# Patient Record
Sex: FEMALE | Race: WHITE | NOT HISPANIC OR LATINO | Employment: OTHER | URBAN - METROPOLITAN AREA
[De-identification: names, ages, dates, MRNs, and addresses within clinical notes are randomized per-mention and may not be internally consistent; named-entity substitution may affect disease eponyms.]

---

## 2018-09-06 LAB — CRC RECOMMENDATION EXT: NORMAL

## 2020-04-07 RX ORDER — MEDROXYPROGESTERONE ACETATE 5 MG/1
1 TABLET ORAL DAILY
COMMUNITY
Start: 2020-01-09 | End: 2020-04-07 | Stop reason: SDUPTHER

## 2020-04-07 RX ORDER — MEDROXYPROGESTERONE ACETATE 5 MG/1
5 TABLET ORAL DAILY
Qty: 90 TABLET | Refills: 2 | Status: SHIPPED | OUTPATIENT
Start: 2020-04-07 | End: 2020-08-13

## 2020-08-13 ENCOUNTER — OFFICE VISIT (OUTPATIENT)
Dept: UROLOGY | Facility: CLINIC | Age: 70
End: 2020-08-13
Payer: MEDICARE

## 2020-08-13 VITALS
SYSTOLIC BLOOD PRESSURE: 128 MMHG | RESPIRATION RATE: 18 BRPM | HEART RATE: 93 BPM | BODY MASS INDEX: 25.69 KG/M2 | WEIGHT: 145 LBS | DIASTOLIC BLOOD PRESSURE: 88 MMHG | HEIGHT: 63 IN

## 2020-08-13 DIAGNOSIS — R30.0 DYSURIA: Primary | ICD-10-CM

## 2020-08-13 DIAGNOSIS — N39.0 RECURRENT UTI: ICD-10-CM

## 2020-08-13 LAB — Lab: 100

## 2020-08-13 PROCEDURE — 99203 OFFICE O/P NEW LOW 30 MIN: CPT | Mod: S$GLB,,, | Performed by: UROLOGY

## 2020-08-13 PROCEDURE — 99203 PR OFFICE/OUTPT VISIT, NEW, LEVL III, 30-44 MIN: ICD-10-PCS | Mod: S$GLB,,, | Performed by: UROLOGY

## 2020-08-13 RX ORDER — FLECAINIDE ACETATE 50 MG/1
TABLET ORAL
COMMUNITY
Start: 2020-07-27

## 2020-08-13 RX ORDER — AMLODIPINE BESYLATE 2.5 MG/1
2.5 TABLET ORAL 2 TIMES DAILY
COMMUNITY
Start: 2020-07-20 | End: 2024-02-26

## 2020-08-13 RX ORDER — CARVEDILOL 6.25 MG/1
6.25 TABLET ORAL 2 TIMES DAILY
COMMUNITY
Start: 2020-06-15 | End: 2023-07-24

## 2020-08-13 RX ORDER — OMEPRAZOLE AND SODIUM BICARBONATE 40; 1100 MG/1; MG/1
1 CAPSULE ORAL
COMMUNITY
End: 2023-07-12

## 2020-08-13 RX ORDER — SERTRALINE HYDROCHLORIDE 100 MG/1
TABLET, FILM COATED ORAL
COMMUNITY
Start: 2020-07-13 | End: 2022-03-31 | Stop reason: ALTCHOICE

## 2020-08-13 RX ORDER — AMITRIPTYLINE HYDROCHLORIDE 10 MG/1
10 TABLET, FILM COATED ORAL NIGHTLY
COMMUNITY
Start: 2020-07-27 | End: 2023-02-06 | Stop reason: SDUPTHER

## 2020-08-13 RX ORDER — LEVOTHYROXINE SODIUM 112 UG/1
112 TABLET ORAL
COMMUNITY
Start: 2020-07-27 | End: 2022-04-14

## 2020-08-13 RX ORDER — PHENAZOPYRIDINE HYDROCHLORIDE 100 MG/1
200 TABLET, FILM COATED ORAL 3 TIMES DAILY PRN
Qty: 15 TABLET | Refills: 1 | Status: SHIPPED | OUTPATIENT
Start: 2020-08-13 | End: 2020-08-18

## 2020-08-13 NOTE — PROGRESS NOTES
Subjective:       Patient ID: Brigitte Bell is a 69 y.o. female.    Chief Complaint: Urinary Tract Infection (burning with urination)      HPI:  69-year-old female with burning with urination she has had UTI for the past 2 weeks she was treated with ciprofloxacin followed by a cephalosporin.  She continues to have dysuria and burning at her urethra.    Past Medical History:   Past Medical History:   Diagnosis Date    Thyroid disease        Past Surgical Historical: History reviewed. No pertinent surgical history.     Medications:   Medication List with Changes/Refills   Current Medications    AMITRIPTYLINE (ELAVIL) 10 MG TABLET    TK 1 T PO HS    AMLODIPINE (NORVASC) 2.5 MG TABLET    TK 1 T PO QD    CARVEDILOL (COREG) 6.25 MG TABLET    TK 1 T PO BID    FLECAINIDE (TAMBOCOR) 50 MG TAB    TK 1 T PO BID    LEVOTHYROXINE (SYNTHROID) 112 MCG TABLET    TK 1 T PO QD    MEDROXYPROGESTERONE (PROVERA) 5 MG TABLET    Take 1 tablet (5 mg total) by mouth once daily.    OMEPRAZOLE-SODIUM BICARBONATE (ZEGERID) 40-1.1 MG-GRAM PER CAPSULE    Take 1 capsule by mouth before breakfast.    SERTRALINE (ZOLOFT) 100 MG TABLET    TK 1 T PO ONCE DAILY        Past Social History:   Social History     Socioeconomic History    Marital status:      Spouse name: Not on file    Number of children: Not on file    Years of education: Not on file    Highest education level: Not on file   Occupational History    Not on file   Social Needs    Financial resource strain: Not on file    Food insecurity     Worry: Not on file     Inability: Not on file    Transportation needs     Medical: Not on file     Non-medical: Not on file   Tobacco Use    Smoking status: Never Smoker    Smokeless tobacco: Never Used   Substance and Sexual Activity    Alcohol use: Not Currently    Drug use: Not Currently    Sexual activity: Yes   Lifestyle    Physical activity     Days per week: Not on file     Minutes per session: Not on file    Stress: Not on  file   Relationships    Social connections     Talks on phone: Not on file     Gets together: Not on file     Attends Yazdanism service: Not on file     Active member of club or organization: Not on file     Attends meetings of clubs or organizations: Not on file     Relationship status: Not on file   Other Topics Concern    Not on file   Social History Narrative    Not on file       Allergies: Review of patient's allergies indicates:  No Known Allergies     Family History:   Family History   Problem Relation Age of Onset    Hypertension Father     Diabetes Mother     Hypertension Mother         Review of Systems:  Review of Systems   Constitutional: Negative for activity change and appetite change.   HENT: Negative for congestion and dental problem.    Respiratory: Negative for chest tightness and shortness of breath.    Cardiovascular: Negative for chest pain.   Gastrointestinal: Negative for abdominal distention.   Genitourinary: Negative for decreased urine volume, difficulty urinating, dyspareunia, dysuria, enuresis, flank pain, frequency, genital sores, hematuria, menstrual problem, pelvic pain, urgency, vaginal bleeding, vaginal discharge and vaginal pain.   Musculoskeletal: Negative for back pain and neck pain.   Neurological: Negative for dizziness.   Hematological: Negative for adenopathy.   Psychiatric/Behavioral: Negative for agitation, behavioral problems and confusion.       Physical Exam:  Physical Exam   Constitutional: She is oriented to person, place, and time. She appears well-developed.   HENT:   Head: Normocephalic and atraumatic.   Right Ear: External ear normal.   Left Ear: External ear normal.   Eyes: Conjunctivae are normal.   Neck: Normal range of motion. No JVD present.   Cardiovascular: Normal rate and regular rhythm.    Pulmonary/Chest: Effort normal and breath sounds normal. No respiratory distress. She has no wheezes.   Abdominal: Soft. She exhibits no distension. There is no  abdominal tenderness. There is no rebound.   Genitourinary:    Vulva normal.     Musculoskeletal: Normal range of motion.   Neurological: She is alert and oriented to person, place, and time.   Skin: Skin is warm and dry. No rash noted. No erythema.     Psychiatric: Her behavior is normal.       Assessment/Plan:       Problem List Items Addressed This Visit     None      Visit Diagnoses     Dysuria    -  Primary    Recurrent UTI                 Recurrent UTI:  Patient's urinalysis today was reviewed and appeared to be completely normal on pelvic exam her urethra did not appear to be irritated and her vulva appeared normal.  She likely has continued irritation from her recent UTI she is currently finishing her cephalosporin.  I will prescribed 4 days of 200 mg Pyridium for analgesia after she finishes her antibiotics if she does not feel better she is to return to clinic for re-evaluation

## 2022-03-31 ENCOUNTER — OFFICE VISIT (OUTPATIENT)
Dept: PRIMARY CARE CLINIC | Facility: CLINIC | Age: 72
End: 2022-03-31
Payer: MEDICARE

## 2022-03-31 VITALS
WEIGHT: 147.19 LBS | DIASTOLIC BLOOD PRESSURE: 77 MMHG | SYSTOLIC BLOOD PRESSURE: 133 MMHG | OXYGEN SATURATION: 100 % | BODY MASS INDEX: 26.08 KG/M2 | HEART RATE: 73 BPM | HEIGHT: 63 IN

## 2022-03-31 DIAGNOSIS — E03.9 ACQUIRED HYPOTHYROIDISM: ICD-10-CM

## 2022-03-31 DIAGNOSIS — Z12.39 ENCOUNTER FOR SCREENING FOR MALIGNANT NEOPLASM OF BREAST, UNSPECIFIED SCREENING MODALITY: Primary | ICD-10-CM

## 2022-03-31 DIAGNOSIS — I49.9 CARDIAC ARRHYTHMIA, UNSPECIFIED CARDIAC ARRHYTHMIA TYPE: ICD-10-CM

## 2022-03-31 DIAGNOSIS — Z78.0 POST-MENOPAUSAL: ICD-10-CM

## 2022-03-31 DIAGNOSIS — Z12.31 ENCOUNTER FOR SCREENING MAMMOGRAM FOR MALIGNANT NEOPLASM OF BREAST: ICD-10-CM

## 2022-03-31 PROCEDURE — 99204 OFFICE O/P NEW MOD 45 MIN: CPT | Mod: S$GLB,,, | Performed by: INTERNAL MEDICINE

## 2022-03-31 PROCEDURE — 99204 PR OFFICE/OUTPT VISIT, NEW, LEVL IV, 45-59 MIN: ICD-10-PCS | Mod: S$GLB,,, | Performed by: INTERNAL MEDICINE

## 2022-03-31 RX ORDER — DICLOFENAC SODIUM 10 MG/G
2 GEL TOPICAL
COMMUNITY

## 2022-03-31 RX ORDER — ONDANSETRON 4 MG/1
4 TABLET, FILM COATED ORAL
COMMUNITY
Start: 2022-03-26 | End: 2023-07-24

## 2022-03-31 RX ORDER — HYOSCYAMINE SULFATE 0.125 MG
125 TABLET ORAL EVERY 4 HOURS PRN
COMMUNITY
End: 2023-07-24

## 2022-03-31 NOTE — PROGRESS NOTES
Subjective:      Patient ID: Brigitte Bell is a 71 y.o. female.    Chief Complaint: Establish Care    HPI     Past Medical History:   Diagnosis Date    Cystitis     Diarrhea     GERD (gastroesophageal reflux disease)     Hypertension     Insomnia     Stomach cramps     Thyroid disease      Past Surgical History:   Procedure Laterality Date    CHOLECYSTECTOMY      COLONOSCOPY      2020    GASTROSCOPY         Family History   Problem Relation Age of Onset    Hypertension Father     Diabetes Mother     Hypertension Mother        Social History     Socioeconomic History    Marital status:    Tobacco Use    Smoking status: Never Smoker    Smokeless tobacco: Never Used   Substance and Sexual Activity    Alcohol use: Not Currently    Drug use: Not Currently    Sexual activity: Yes       Dr Jin is her Cardiologist  Dr Keith at [facility]  Dr Sawn in [place] is her Urologist      Review of Systems   Constitutional: Negative for chills and fever.   HENT: Negative for hearing loss.    Eyes: Negative for blurred vision.   Respiratory: Negative for cough, shortness of breath and wheezing.    Cardiovascular: Negative for chest pain, palpitations and leg swelling.   Gastrointestinal: Negative for abdominal pain, blood in stool, constipation, diarrhea, nausea and vomiting.   Genitourinary: Positive for dysuria. Negative for frequency and urgency.   Musculoskeletal: Negative for falls and joint pain.   Skin: Negative for itching and rash.   Neurological: Negative for dizziness and headaches.   Endo/Heme/Allergies: Does not bruise/bleed easily.   Psychiatric/Behavioral: Negative for depression. The patient is not nervous/anxious.      Objective:     Physical Exam  Vitals reviewed.   Constitutional:       Appearance: Normal appearance.   HENT:      Head: Normocephalic.      Right Ear: Ear canal and external ear normal. There is no impacted cerumen.      Left  "Ear: Ear canal and external ear normal. There is no impacted cerumen.      Mouth/Throat:      Mouth: Mucous membranes are moist.      Pharynx: Oropharynx is clear.   Eyes:      Extraocular Movements: Extraocular movements intact.      Conjunctiva/sclera: Conjunctivae normal.      Pupils: Pupils are equal, round, and reactive to light.   Cardiovascular:      Rate and Rhythm: Normal rate and regular rhythm.   Pulmonary:      Effort: Pulmonary effort is normal.      Breath sounds: Normal breath sounds.   Abdominal:      General: Bowel sounds are normal.   Musculoskeletal:         General: Normal range of motion.      Right lower leg: No edema.      Left lower leg: No edema.   Skin:     General: Skin is warm.      Capillary Refill: Capillary refill takes less than 2 seconds.   Neurological:      General: No focal deficit present.      Mental Status: She is alert and oriented to person, place, and time.   Psychiatric:         Mood and Affect: Mood normal.        /77 (BP Location: Left arm, Patient Position: Sitting, BP Method: Medium (Automatic))   Pulse 73   Ht 5' 3" (1.6 m)   Wt 66.8 kg (147 lb 3.2 oz)   SpO2 100%   BMI 26.08 kg/m²     Assessment:       ICD-10-CM ICD-9-CM   1. Encounter for screening for malignant neoplasm of breast, unspecified screening modality  Z12.39 V76.10   2. Post-menopausal  Z78.0 V49.81   3. Encounter for screening mammogram for malignant neoplasm of breast   Z12.31 V76.12   4. Acquired hypothyroidism  E03.9 244.9   5. Cardiac arrhythmia, unspecified cardiac arrhythmia type  I49.9 427.9       Plan:     Medication List with Changes/Refills   Current Medications    AMITRIPTYLINE (ELAVIL) 10 MG TABLET    Take 10 mg by mouth every evening.    AMLODIPINE (NORVASC) 2.5 MG TABLET    Take 2.5 mg by mouth 2 (two) times daily.    CARVEDILOL (COREG) 6.25 MG TABLET    Take 6.25 mg by mouth 2 (two) times daily.    DICLOFENAC SODIUM (VOLTAREN) 1 % GEL    Apply 2 g topically as needed.    " ESTRADIOL 10 MCG INPK    0.01% VAGINAL VREAM- APPL Q HAS    FLECAINIDE (TAMBOCOR) 50 MG TAB    TK 1 T PO BID    HYOSCYAMINE (ANASPAZ,LEVSIN) 0.125 MG TAB    Take 125 mcg by mouth every 4 (four) hours as needed.    LEVOTHYROXINE (SYNTHROID) 112 MCG TABLET    Take 112 mcg by mouth before breakfast.    MEDROXYPROGESTERONE (PROVERA) 5 MG TABLET    Take 1 tablet (5 mg total) by mouth once daily.    OMEPRAZOLE-SODIUM BICARBONATE (ZEGERID) 40-1.1 MG-GRAM PER CAPSULE    Take 1 capsule by mouth before breakfast.    ONDANSETRON (ZOFRAN) 4 MG TABLET    Take 4 mg by mouth as needed.   Discontinued Medications    SERTRALINE (ZOLOFT) 100 MG TABLET    TK 1 T PO ONCE DAILY        Encounter for screening for malignant neoplasm of breast, unspecified screening modality  -     Mammo Digital Screening Bilat; Future; Expected date: 03/31/2022    Post-menopausal  -     DXA Bone Density Appendicular Skeleton; Future; Expected date: 03/31/2022    Encounter for screening mammogram for malignant neoplasm of breast   -     Mammo Digital Screening Bilat; Future; Expected date: 03/31/2022    Acquired hypothyroidism    Cardiac arrhythmia, unspecified cardiac arrhythmia type

## 2022-04-08 ENCOUNTER — PATIENT MESSAGE (OUTPATIENT)
Dept: PRIMARY CARE CLINIC | Facility: CLINIC | Age: 72
End: 2022-04-08
Payer: MEDICARE

## 2022-04-08 ENCOUNTER — TELEPHONE (OUTPATIENT)
Dept: PRIMARY CARE CLINIC | Facility: CLINIC | Age: 72
End: 2022-04-08
Payer: MEDICARE

## 2022-04-08 NOTE — TELEPHONE ENCOUNTER
----- Message from Navya Wilson sent at 4/8/2022  9:14 AM CDT -----  Regarding: P advice  Contact: Pt  Pt is calling to speak to nurse regarding having her thyroid medication changed and increased. Please call back at [phone]//thank you acc

## 2022-04-13 ENCOUNTER — PATIENT OUTREACH (OUTPATIENT)
Dept: ADMINISTRATIVE | Facility: HOSPITAL | Age: 72
End: 2022-04-13
Payer: MEDICARE

## 2022-04-13 NOTE — PROGRESS NOTES
Fax request sent to [facility] in [facility] for Colonoscopy    4.21.2022 Fax request sent again for Colonoscopy results.

## 2022-04-13 NOTE — LETTER
AUTHORIZATION FOR RELEASE OF   CONFIDENTIAL INFORMATION    Dear [facility]    We are seeing Brigitte Bell, date of birth 1950, in the clinic at [facility]. Lotus Berger MD is the patient's PCP. Brigitte Bell has an outstanding lab/procedure at the time we reviewed her chart. In order to help keep her health information updated, she has authorized us to request the following medical record(s):        (  )  MAMMOGRAM                                      (X  )  COLONOSCOPY      (  )  PAP SMEAR                                          (  )  OUTSIDE LAB RESULTS     (  )  DEXA SCAN                                          (  )  EYE EXAM            (  )  FOOT EXAM                                          (  )  ENTIRE RECORD     (  )  OUTSIDE IMMUNIZATIONS                 (  )  _______________         Please fax records to [facility], [phone]     If you have any questions, please contact Adelaida LOPEZ [facility] Care Coordinator at   [phone]          Patient Name: Brigitte Bell  : 1950  Patient Phone #: [phone]

## 2022-04-14 ENCOUNTER — OFFICE VISIT (OUTPATIENT)
Dept: PRIMARY CARE CLINIC | Facility: CLINIC | Age: 72
End: 2022-04-14
Payer: MEDICARE

## 2022-04-14 ENCOUNTER — PATIENT MESSAGE (OUTPATIENT)
Dept: PRIMARY CARE CLINIC | Facility: CLINIC | Age: 72
End: 2022-04-14

## 2022-04-14 ENCOUNTER — TELEPHONE (OUTPATIENT)
Dept: PRIMARY CARE CLINIC | Facility: CLINIC | Age: 72
End: 2022-04-14

## 2022-04-14 VITALS
HEART RATE: 78 BPM | DIASTOLIC BLOOD PRESSURE: 78 MMHG | BODY MASS INDEX: 26.05 KG/M2 | SYSTOLIC BLOOD PRESSURE: 146 MMHG | HEIGHT: 63 IN | OXYGEN SATURATION: 99 % | WEIGHT: 147 LBS

## 2022-04-14 DIAGNOSIS — K22.70 BARRETT'S ESOPHAGUS WITHOUT DYSPLASIA: ICD-10-CM

## 2022-04-14 DIAGNOSIS — Z78.0 POST-MENOPAUSAL: ICD-10-CM

## 2022-04-14 DIAGNOSIS — E03.9 ACQUIRED HYPOTHYROIDISM: Primary | ICD-10-CM

## 2022-04-14 DIAGNOSIS — I49.9 CARDIAC ARRHYTHMIA, UNSPECIFIED CARDIAC ARRHYTHMIA TYPE: ICD-10-CM

## 2022-04-14 PROCEDURE — 99214 OFFICE O/P EST MOD 30 MIN: CPT | Mod: S$GLB,,, | Performed by: INTERNAL MEDICINE

## 2022-04-14 PROCEDURE — 99214 PR OFFICE/OUTPT VISIT, EST, LEVL IV, 30-39 MIN: ICD-10-PCS | Mod: S$GLB,,, | Performed by: INTERNAL MEDICINE

## 2022-04-14 RX ORDER — LEVOTHYROXINE SODIUM 100 UG/1
100 TABLET ORAL
Qty: 30 TABLET | Refills: 11 | Status: SHIPPED | OUTPATIENT
Start: 2022-04-14 | End: 2023-05-03

## 2022-04-14 NOTE — PROGRESS NOTES
Subjective:      Patient ID: Brigitte Bell is a 71 y.o. female.    Chief Complaint: Fatigue and Results (Labs from her GI)    HPI     Here for follow up.   Her elavil has been increased to 25 mg at night for anxiety and for sleep. She states she gets enough sleep.   Dr Sagar Rodriguez in [place] is her GI physician and she has an EGD scheduled next week for Florentino's as it has been 3 years since last checked   She is no longer on the provera and just takes the estradiol cream every night, she has not had a hysterectomy in the past  Dr Jin Cardiologist at [facility] and next appointment is next week   Her thyroid levels were checked and TSH was quite low, she states she has been on 112 mcg for quite some time    Review of Systems   Constitutional: Positive for weight loss. Negative for chills and fever.        Has lost 20 lbs in the last year   HENT: Negative for hearing loss.    Eyes: Negative for blurred vision.   Respiratory: Negative for cough, shortness of breath and wheezing.    Cardiovascular: Negative for chest pain, palpitations and leg swelling.   Gastrointestinal: Positive for diarrhea. Negative for abdominal pain, blood in stool, constipation, melena, nausea and vomiting.        With IBS   Genitourinary:        Has cystitis   Musculoskeletal: Negative for falls.   Neurological: Negative for dizziness and headaches.   Psychiatric/Behavioral: Negative for depression, substance abuse and suicidal ideas. The patient is nervous/anxious and has insomnia.      Objective:     Physical Exam  Constitutional:       Appearance: Normal appearance.   HENT:      Head: Normocephalic.   Eyes:      Extraocular Movements: Extraocular movements intact.      Conjunctiva/sclera: Conjunctivae normal.      Pupils: Pupils are equal, round, and reactive to light.   Cardiovascular:      Rate and Rhythm: Normal rate and regular rhythm.   Pulmonary:      Effort: Pulmonary effort is normal.      Breath sounds: Normal breath sounds.  "  Abdominal:      General: Bowel sounds are normal.   Musculoskeletal:         General: Normal range of motion.   Skin:     General: Skin is warm.      Capillary Refill: Capillary refill takes less than 2 seconds.   Neurological:      General: No focal deficit present.      Mental Status: She is alert and oriented to person, place, and time.   Psychiatric:         Mood and Affect: Mood normal.        BP (!) 146/78 (BP Location: Left arm, Patient Position: Sitting, BP Method: Medium (Automatic))   Pulse 78   Ht 5' 3" (1.6 m)   Wt 66.7 kg (147 lb)   SpO2 99%   BMI 26.04 kg/m²     Assessment:       ICD-10-CM ICD-9-CM   1. Acquired hypothyroidism  E03.9 244.9   2. Post-menopausal  Z78.0 V49.81   3. Cardiac arrhythmia, unspecified cardiac arrhythmia type  I49.9 427.9   4. 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   Current Medications    AMITRIPTYLINE (ELAVIL) 10 MG TABLET    Take 10 mg by mouth every evening.    AMLODIPINE (NORVASC) 2.5 MG TABLET    Take 2.5 mg by mouth 2 (two) times daily.    CARVEDILOL (COREG) 6.25 MG TABLET    Take 6.25 mg by mouth 2 (two) times daily.    DICLOFENAC SODIUM (VOLTAREN) 1 % GEL    Apply 2 g topically as needed.    ESTRADIOL 10 MCG INPK    0.01% VAGINAL VREAM- APPL Q HAS    FLECAINIDE (TAMBOCOR) 50 MG TAB    TK 1 T PO BID    HYOSCYAMINE (ANASPAZ,LEVSIN) 0.125 MG TAB    Take 125 mcg by mouth every 4 (four) hours as needed.    MEDROXYPROGESTERONE (PROVERA) 5 MG TABLET    Take 1 tablet (5 mg total) by mouth once daily.    OMEPRAZOLE-SODIUM BICARBONATE (ZEGERID) 40-1.1 MG-GRAM PER CAPSULE    Take 1 capsule by mouth before breakfast.    ONDANSETRON (ZOFRAN) 4 MG TABLET    Take 4 mg by mouth as needed.   Discontinued Medications    LEVOTHYROXINE (SYNTHROID) 112 MCG TABLET    Take 112 mcg by mouth before " breakfast.        Acquired hypothyroidism  -     levothyroxine (SYNTHROID) 100 MCG tablet; Take 1 tablet (100 mcg total) by mouth before breakfast.  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

## 2022-04-14 NOTE — TELEPHONE ENCOUNTER
----- Message from Daisy Beualieu sent at 4/14/2022 10:43 AM CDT -----  Contact: Patient  Patient need her order sent over for her bone density test    Please let patient know once order has been sent over      Call back #  [phone]

## 2022-05-04 LAB — BCS RECOMMENDATION EXT: NORMAL

## 2022-05-05 ENCOUNTER — PATIENT MESSAGE (OUTPATIENT)
Dept: PRIMARY CARE CLINIC | Facility: CLINIC | Age: 72
End: 2022-05-05
Payer: MEDICARE

## 2022-05-27 ENCOUNTER — PATIENT OUTREACH (OUTPATIENT)
Dept: ADMINISTRATIVE | Facility: HOSPITAL | Age: 72
End: 2022-05-27
Payer: MEDICARE

## 2022-05-27 NOTE — PROGRESS NOTES
Uploading and hyper-linking Mammogram done 5.4.2022. Colonoscopy done 9.6.2018. Unable to locate path results. Multiple communications made to obtain entire colonoscopy report but no success. [place]

## 2022-06-03 ENCOUNTER — TELEPHONE (OUTPATIENT)
Dept: PRIMARY CARE CLINIC | Facility: CLINIC | Age: 72
End: 2022-06-03
Payer: MEDICARE

## 2022-06-03 NOTE — TELEPHONE ENCOUNTER
The patient is advised her thyroid labs are ready and she can get this done at her convenience.       ----- Message from Lina Ruiz sent at 6/3/2022 10:06 AM CDT -----  Contact: self  Pt calling about getting blood drawn to check thyroid levels.  Pt can be reached at [phone].      Thanks,

## 2022-06-09 LAB
T4 FREE SERPL-MCNC: 1.1 NG/DL (ref 0.8–1.8)
TSH SERPL-ACNC: 0.88 MIU/L (ref 0.4–4.5)

## 2022-06-16 ENCOUNTER — PATIENT MESSAGE (OUTPATIENT)
Dept: PRIMARY CARE CLINIC | Facility: CLINIC | Age: 72
End: 2022-06-16
Payer: MEDICARE

## 2022-07-14 ENCOUNTER — OFFICE VISIT (OUTPATIENT)
Dept: PRIMARY CARE CLINIC | Facility: CLINIC | Age: 72
End: 2022-07-14
Payer: MEDICARE

## 2022-07-14 VITALS
HEIGHT: 63 IN | OXYGEN SATURATION: 97 % | BODY MASS INDEX: 26.26 KG/M2 | WEIGHT: 148.19 LBS | HEART RATE: 75 BPM | SYSTOLIC BLOOD PRESSURE: 154 MMHG | DIASTOLIC BLOOD PRESSURE: 76 MMHG

## 2022-07-14 DIAGNOSIS — L65.9 HAIR LOSS: ICD-10-CM

## 2022-07-14 DIAGNOSIS — I49.9 CARDIAC ARRHYTHMIA, UNSPECIFIED CARDIAC ARRHYTHMIA TYPE: ICD-10-CM

## 2022-07-14 DIAGNOSIS — E78.5 HYPERLIPIDEMIA, UNSPECIFIED HYPERLIPIDEMIA TYPE: Primary | ICD-10-CM

## 2022-07-14 PROCEDURE — 99214 PR OFFICE/OUTPT VISIT, EST, LEVL IV, 30-39 MIN: ICD-10-PCS | Mod: S$GLB,,, | Performed by: INTERNAL MEDICINE

## 2022-07-14 PROCEDURE — 99214 OFFICE O/P EST MOD 30 MIN: CPT | Mod: S$GLB,,, | Performed by: INTERNAL MEDICINE

## 2022-07-14 RX ORDER — MINOXIDIL 2 %
SOLUTION, NON-ORAL TOPICAL 2 TIMES DAILY
Qty: 60 ML | Refills: 0 | Status: SHIPPED | OUTPATIENT
Start: 2022-07-14 | End: 2024-02-26

## 2022-07-14 NOTE — PROGRESS NOTES
Subjective:      Patient ID: Brigitte Bell is a 71 y.o. female.    Chief Complaint: Follow-up (No change with her hair loss.)    HPI    Patient here for follow up. She has a Gastroenterologist in [place] for IBS, she has a Cardiologist here in [place] and denies any acute complaints. Her levothyroxine was decreased and levels were stable after that. She has some female pattern hair loss. She denies dying her hair or using any chemicals. She states they break off.     She also has 2 bumps on the dorsal surface of her feet close to her ankle which have been there for some time and she is not concerned. They seem more like thickening of the skin, no palpable mass felt. She has had some foot surgery in the past     Review of Systems   Constitutional: Negative for chills, fever and weight loss.   Respiratory: Negative for cough, shortness of breath and wheezing.    Cardiovascular: Negative for chest pain, palpitations and leg swelling.   Gastrointestinal: Negative for abdominal pain, constipation, diarrhea, nausea and vomiting.   Genitourinary: Negative for dysuria, frequency and urgency.   Musculoskeletal: Positive for joint pain. Negative for falls.        Hand pain   Skin: Negative for rash.   Neurological: Negative for dizziness and headaches.   Endo/Heme/Allergies: Does not bruise/bleed easily.   Psychiatric/Behavioral: Negative for depression. The patient is nervous/anxious.      Objective:     Physical Exam  Vitals reviewed.   Constitutional:       Appearance: Normal appearance.   HENT:      Head: Normocephalic.   Eyes:      Extraocular Movements: Extraocular movements intact.      Conjunctiva/sclera: Conjunctivae normal.      Pupils: Pupils are equal, round, and reactive to light.   Cardiovascular:      Rate and Rhythm: Normal rate and regular rhythm.   Pulmonary:      Effort: Pulmonary effort is normal.      Breath sounds: Normal breath sounds.   Abdominal:      General: Bowel sounds are normal.  "  Musculoskeletal:         General: Normal range of motion.      Right lower leg: No edema.      Left lower leg: No edema.      Comments: Some thickening of joints at distal hand joints, no proximal joint swelling   Skin:     General: Skin is warm.      Capillary Refill: Capillary refill takes less than 2 seconds.      Findings: No rash.   Neurological:      General: No focal deficit present.      Mental Status: She is alert and oriented to person, place, and time.   Psychiatric:         Mood and Affect: Mood normal.        BP (!) 154/76 (BP Location: Left arm, Patient Position: Sitting, BP Method: Medium (Automatic))   Pulse 75   Ht 5' 3" (1.6 m)   Wt 67.2 kg (148 lb 3.2 oz)   SpO2 97%   BMI 26.25 kg/m²     Assessment:       ICD-10-CM ICD-9-CM   1. Hyperlipidemia, unspecified hyperlipidemia type  E78.5 272.4   2. Hair loss  L65.9 704.00   3. Cardiac arrhythmia, unspecified cardiac arrhythmia type  I49.9 427.9       Plan:     Medication List with Changes/Refills   New Medications    MINOXIDIL (ROGAINE) 2 % EXTERNAL SOLUTION    Apply topically 2 (two) times daily. Can get otc, apply very small film to avoid low BP   Current Medications    AMITRIPTYLINE (ELAVIL) 10 MG TABLET    Take 10 mg by mouth every evening.    AMLODIPINE (NORVASC) 2.5 MG TABLET    Take 2.5 mg by mouth 2 (two) times daily.    CARVEDILOL (COREG) 6.25 MG TABLET    Take 6.25 mg by mouth 2 (two) times daily.    DICLOFENAC SODIUM (VOLTAREN) 1 % GEL    Apply 2 g topically as needed.    ESTRADIOL 10 MCG INPK    0.01% VAGINAL VREAM- APPL Q HAS    FLECAINIDE (TAMBOCOR) 50 MG TAB    TK 1 T PO BID    HYOSCYAMINE (ANASPAZ,LEVSIN) 0.125 MG TAB    Take 125 mcg by mouth every 4 (four) hours as needed.    LEVOTHYROXINE (SYNTHROID) 100 MCG TABLET    Take 1 tablet (100 mcg total) by mouth before breakfast.    MEDROXYPROGESTERONE (PROVERA) 5 MG TABLET    Take 1 tablet (5 mg total) by mouth once daily.    OMEPRAZOLE-SODIUM BICARBONATE (ZEGERID) 40-1.1 MG-GRAM " PER CAPSULE    Take 1 capsule by mouth before breakfast.    ONDANSETRON (ZOFRAN) 4 MG TABLET    Take 4 mg by mouth as needed.        Hyperlipidemia, unspecified hyperlipidemia type  -     Lipid Panel; Future; Expected date: 07/14/2022    Hair loss  -     Cancel: Ambulatory referral/consult to Dermatology; Future; Expected date: 07/21/2022  -     minoxidiL (ROGAINE) 2 % external solution; Apply topically 2 (two) times daily. Can get otc, apply very small film to avoid low BP  Dispense: 60 mL; Refill: 0    Cardiac arrhythmia, unspecified cardiac arrhythmia type  -     Lipid Panel; Future; Expected date: 07/14/2022         She can try a multivitamin that has zinc as well.     Thyroid levels at next visit. She checks her BP at home and states it is more on the low side. Will need BP log at next visit

## 2022-07-15 ENCOUNTER — PATIENT MESSAGE (OUTPATIENT)
Dept: PRIMARY CARE CLINIC | Facility: CLINIC | Age: 72
End: 2022-07-15
Payer: MEDICARE

## 2022-07-15 LAB
CHOLEST SERPL-MCNC: 186 MG/DL
CHOLEST/HDLC SERPL: 2.5 (CALC)
HDLC SERPL-MCNC: 74 MG/DL
LDLC SERPL CALC-MCNC: 95 MG/DL (CALC)
NONHDLC SERPL-MCNC: 112 MG/DL (CALC)
TRIGL SERPL-MCNC: 76 MG/DL

## 2022-09-29 DIAGNOSIS — E87.5 HYPERKALEMIA: Primary | ICD-10-CM

## 2022-09-30 ENCOUNTER — PATIENT MESSAGE (OUTPATIENT)
Dept: PRIMARY CARE CLINIC | Facility: CLINIC | Age: 72
End: 2022-09-30
Payer: MEDICARE

## 2023-01-12 ENCOUNTER — OFFICE VISIT (OUTPATIENT)
Dept: PRIMARY CARE CLINIC | Facility: CLINIC | Age: 73
End: 2023-01-12
Payer: MEDICARE

## 2023-01-12 VITALS
HEIGHT: 63 IN | DIASTOLIC BLOOD PRESSURE: 70 MMHG | HEART RATE: 69 BPM | WEIGHT: 151.81 LBS | SYSTOLIC BLOOD PRESSURE: 130 MMHG | BODY MASS INDEX: 26.9 KG/M2 | OXYGEN SATURATION: 99 %

## 2023-01-12 DIAGNOSIS — E87.5 HYPERKALEMIA: ICD-10-CM

## 2023-01-12 DIAGNOSIS — Z78.0 POST-MENOPAUSAL: ICD-10-CM

## 2023-01-12 DIAGNOSIS — E78.5 HYPERLIPIDEMIA, UNSPECIFIED HYPERLIPIDEMIA TYPE: ICD-10-CM

## 2023-01-12 DIAGNOSIS — K58.0 IRRITABLE BOWEL SYNDROME WITH DIARRHEA: Primary | ICD-10-CM

## 2023-01-12 DIAGNOSIS — E03.9 ACQUIRED HYPOTHYROIDISM: ICD-10-CM

## 2023-01-12 PROCEDURE — 99214 PR OFFICE/OUTPT VISIT, EST, LEVL IV, 30-39 MIN: ICD-10-PCS | Mod: S$GLB,,, | Performed by: INTERNAL MEDICINE

## 2023-01-12 PROCEDURE — 99214 OFFICE O/P EST MOD 30 MIN: CPT | Mod: S$GLB,,, | Performed by: INTERNAL MEDICINE

## 2023-01-12 RX ORDER — MEDROXYPROGESTERONE ACETATE 5 MG/1
5 TABLET ORAL DAILY
Qty: 90 TABLET | Refills: 2 | Status: CANCELLED | OUTPATIENT
Start: 2023-01-12 | End: 2023-04-12

## 2023-01-12 NOTE — PROGRESS NOTES
Subjective:      Patient ID: Brigitte Bell is a 72 y.o. female.    Chief Complaint: Follow-up (She states she is depressed due to her IBS-D and cystitis.)    HPI      Patient here for follow up  She has a diagnosis of IBS and was seeing Dr Keith at [facility] and all imagine testing etc has been done and now she would like to establish with GI here. She states she has diarrhea in the beginning of the day. She also had a cholecystectomy and is on Questran but does not take it daily  She does report some formed stool during the day  She states she is on 25 mg of Elavil nightly and has enough refills  She does not want any other anti depressant at this time  Abdominal cramping is controlled with levsin    Review of Systems   Constitutional:  Negative for chills, fever and weight loss.   Respiratory:  Negative for cough, shortness of breath and wheezing.    Cardiovascular:  Negative for chest pain, palpitations and leg swelling.   Gastrointestinal:  Positive for abdominal pain and diarrhea. Negative for blood in stool, melena, nausea and vomiting.   Musculoskeletal:  Negative for falls and joint pain.   Skin:  Negative for rash.   Neurological:  Negative for dizziness, weakness and headaches.   Psychiatric/Behavioral:  Positive for depression. Negative for substance abuse. The patient does not have insomnia.    Objective:     Physical Exam  Vitals reviewed.   Constitutional:       Appearance: Normal appearance.   HENT:      Head: Normocephalic.   Eyes:      Extraocular Movements: Extraocular movements intact.      Conjunctiva/sclera: Conjunctivae normal.      Pupils: Pupils are equal, round, and reactive to light.   Cardiovascular:      Rate and Rhythm: Normal rate and regular rhythm.   Pulmonary:      Effort: Pulmonary effort is normal.      Breath sounds: Normal breath sounds.   Abdominal:      General: Bowel sounds are normal.   Musculoskeletal:         General: Normal range of motion.   Skin:     General: Skin is  "warm.      Capillary Refill: Capillary refill takes less than 2 seconds.   Neurological:      General: No focal deficit present.      Mental Status: She is alert and oriented to person, place, and time.   Psychiatric:         Mood and Affect: Mood normal.     /70 (BP Location: Right arm, Patient Position: Sitting, BP Method: Medium (Manual))   Pulse 69   Ht 5' 3" (1.6 m)   Wt 68.9 kg (151 lb 12.8 oz)   SpO2 99%   BMI 26.89 kg/m²     Assessment:       ICD-10-CM ICD-9-CM   1. Irritable bowel syndrome with diarrhea  K58.0 564.1   2. Hyperkalemia  E87.5 276.7   3. Post-menopausal  Z78.0 V49.81   4. Acquired hypothyroidism  E03.9 244.9   5. Hyperlipidemia, unspecified hyperlipidemia type  E78.5 272.4       Plan:     Medication List with Changes/Refills   Current Medications    AMITRIPTYLINE (ELAVIL) 10 MG TABLET    Take 10 mg by mouth every evening.    AMLODIPINE (NORVASC) 2.5 MG TABLET    Take 2.5 mg by mouth 2 (two) times daily.    CARVEDILOL (COREG) 6.25 MG TABLET    Take 6.25 mg by mouth 2 (two) times daily.    DICLOFENAC SODIUM (VOLTAREN) 1 % GEL    Apply 2 g topically as needed.    FLECAINIDE (TAMBOCOR) 50 MG TAB    TK 1 T PO BID    HYOSCYAMINE (ANASPAZ,LEVSIN) 0.125 MG TAB    Take 125 mcg by mouth every 4 (four) hours as needed.    LEVOTHYROXINE (SYNTHROID) 100 MCG TABLET    Take 1 tablet (100 mcg total) by mouth before breakfast.    MEDROXYPROGESTERONE (PROVERA) 5 MG TABLET    Take 1 tablet (5 mg total) by mouth once daily.    MINOXIDIL (ROGAINE) 2 % EXTERNAL SOLUTION    Apply topically 2 (two) times daily. Can get otc, apply very small film to avoid low BP    OMEPRAZOLE-SODIUM BICARBONATE (ZEGERID) 40-1.1 MG-GRAM PER CAPSULE    Take 1 capsule by mouth before breakfast.    ONDANSETRON (ZOFRAN) 4 MG TABLET    Take 4 mg by mouth as needed.   Changed and/or Refilled Medications    Modified Medication Previous Medication    ESTRADIOL 10 MCG INPK estradioL 10 mcg InPk       0.01% VAGINAL VREAM- APPL Q HAS " Strength: 10 mcg    0.01% VAGINAL VREAM- APPL Q HAS        1. Irritable bowel syndrome with diarrhea  -     Comprehensive Metabolic Panel; Future; Expected date: 01/12/2023  -     CBC Auto Differential; Future; Expected date: 01/12/2023  -     Amylase; Future; Expected date: 01/12/2023  -     Lipase; Future; Expected date: 01/12/2023  -     Tissue transglutaminase, IgA; Future; Expected date: 01/12/2023    2. Hyperkalemia    3. Post-menopausal    4. Acquired hypothyroidism    5. Hyperlipidemia, unspecified hyperlipidemia type    Other orders  -     estradioL 10 mcg InPk; 0.01% VAGINAL VREAM- APPL Q HAS Strength: 10 mcg  Dispense: 18 each; Refill: 3         Patient had an Abdomen/Pelvis CT done in 2020 which was showed few scattered colonic diverticula. Minimal fat stranding along the descending colon is suspicious for mild diverticulitis  She states she has had EGD and colonoscopy with GI in [place]. Will attempt to get records      Future Appointments   Date Time Provider Department Center   7/24/2023  7:00 AM Ольга Nugetn MD [facility] GASTR[facility]  [place]

## 2023-01-13 LAB
ALBUMIN SERPL-MCNC: 4.1 G/DL (ref 3.6–5.1)
ALBUMIN/GLOB SERPL: 1.6 (CALC) (ref 1–2.5)
ALP SERPL-CCNC: 63 U/L (ref 37–153)
ALT SERPL-CCNC: 21 U/L (ref 6–29)
AMYLASE SERPL-CCNC: 58 U/L (ref 21–101)
AST SERPL-CCNC: 19 U/L (ref 10–35)
BASOPHILS # BLD AUTO: 43 CELLS/UL (ref 0–200)
BASOPHILS NFR BLD AUTO: 0.7 %
BILIRUB SERPL-MCNC: 0.8 MG/DL (ref 0.2–1.2)
BUN SERPL-MCNC: 15 MG/DL (ref 7–25)
BUN/CREAT SERPL: ABNORMAL (CALC) (ref 6–22)
CALCIUM SERPL-MCNC: 8.9 MG/DL (ref 8.6–10.4)
CHLORIDE SERPL-SCNC: 99 MMOL/L (ref 98–110)
CO2 SERPL-SCNC: 27 MMOL/L (ref 20–32)
CREAT SERPL-MCNC: 0.65 MG/DL (ref 0.6–1)
EGFR: 92 ML/MIN/1.73M2
EOSINOPHIL # BLD AUTO: 98 CELLS/UL (ref 15–500)
EOSINOPHIL NFR BLD AUTO: 1.6 %
ERYTHROCYTE [DISTWIDTH] IN BLOOD BY AUTOMATED COUNT: 13.5 % (ref 11–15)
GLOBULIN SER CALC-MCNC: 2.6 G/DL (CALC) (ref 1.9–3.7)
GLUCOSE SERPL-MCNC: 74 MG/DL (ref 65–99)
HCT VFR BLD AUTO: 40.8 % (ref 35–45)
HGB BLD-MCNC: 13.7 G/DL (ref 11.7–15.5)
LIPASE SERPL-CCNC: 34 U/L (ref 7–60)
LYMPHOCYTES # BLD AUTO: 1629 CELLS/UL (ref 850–3900)
LYMPHOCYTES NFR BLD AUTO: 26.7 %
MCH RBC QN AUTO: 28.8 PG (ref 27–33)
MCHC RBC AUTO-ENTMCNC: 33.6 G/DL (ref 32–36)
MCV RBC AUTO: 85.9 FL (ref 80–100)
MONOCYTES # BLD AUTO: 451 CELLS/UL (ref 200–950)
MONOCYTES NFR BLD AUTO: 7.4 %
NEUTROPHILS # BLD AUTO: 3880 CELLS/UL (ref 1500–7800)
NEUTROPHILS NFR BLD AUTO: 63.6 %
PLATELET # BLD AUTO: 236 THOUSAND/UL (ref 140–400)
PMV BLD REES-ECKER: 10.2 FL (ref 7.5–12.5)
POTASSIUM SERPL-SCNC: 4.5 MMOL/L (ref 3.5–5.3)
PROT SERPL-MCNC: 6.7 G/DL (ref 6.1–8.1)
RBC # BLD AUTO: 4.75 MILLION/UL (ref 3.8–5.1)
SODIUM SERPL-SCNC: 132 MMOL/L (ref 135–146)
TTG IGA SER-ACNC: <1 U/ML
WBC # BLD AUTO: 6.1 THOUSAND/UL (ref 3.8–10.8)

## 2023-01-23 ENCOUNTER — TELEPHONE (OUTPATIENT)
Dept: PRIMARY CARE CLINIC | Facility: CLINIC | Age: 73
End: 2023-01-23
Payer: MEDICARE

## 2023-01-23 NOTE — TELEPHONE ENCOUNTER
The [facility] pharmacy is wanting to know if the estradiol is going to be a tab or the cream.

## 2023-01-24 ENCOUNTER — TELEPHONE (OUTPATIENT)
Dept: PRIMARY CARE CLINIC | Facility: CLINIC | Age: 73
End: 2023-01-24
Payer: MEDICARE

## 2023-01-24 NOTE — TELEPHONE ENCOUNTER
----- Message from Alexandra Bella sent at 1/24/2023 11:11 AM CST -----  Contact: Collins([facility])  Collins called or consult with nurse or staff regarding a prescription they received. He states he is needing to clarify directions and if the doctor wants to prescribe a cream or tablet. The Pharmacy would like a call back and can be reached at [phone]. Thanks/MR

## 2023-01-31 ENCOUNTER — PATIENT MESSAGE (OUTPATIENT)
Dept: PRIMARY CARE CLINIC | Facility: CLINIC | Age: 73
End: 2023-01-31
Payer: MEDICARE

## 2023-02-06 RX ORDER — AMITRIPTYLINE HYDROCHLORIDE 10 MG/1
10 TABLET, FILM COATED ORAL NIGHTLY
Qty: 90 TABLET | Refills: 3 | Status: SHIPPED | OUTPATIENT
Start: 2023-02-06 | End: 2023-02-08

## 2023-02-06 NOTE — TELEPHONE ENCOUNTER
----- Message from Veronica Diaz sent at 2/6/2023  9:57 AM CST -----  Contact: self  Type:  RX Refill Request    Who Called: Brigitte Bell   Refill or New Rx: Refill   RX Name and Strength: amitriptyline (ELAVIL) 25 MG tablet  How is the patient currently taking it? (ex. 1XDay):1x/day   Is this a 30 day or 90 day RX:90 day   Preferred Pharmacy with phone number:  [facility] [id number] - [place], [place] - [address]  [address]  [place] [address]  Phone: [phone] Fax: [phone]  Local or Mail Order:Local   Ordering Provider:Dr Berger   Would the patient rather a call back or a response via [facility]? Call back   Best Call Back Number:[phone]   Additional Information: Please let patient know once it has been sent - ty!

## 2023-02-07 ENCOUNTER — PATIENT MESSAGE (OUTPATIENT)
Dept: PRIMARY CARE CLINIC | Facility: CLINIC | Age: 73
End: 2023-02-07
Payer: MEDICARE

## 2023-02-08 RX ORDER — AMITRIPTYLINE HYDROCHLORIDE 25 MG/1
25 TABLET, FILM COATED ORAL NIGHTLY
Qty: 90 TABLET | Refills: 3 | Status: SHIPPED | OUTPATIENT
Start: 2023-02-08 | End: 2023-12-11

## 2023-02-08 NOTE — TELEPHONE ENCOUNTER
PLEASE SEE PREVIOUS  NOTE--PT IS ASKING FOR 25MG OF ELAVIL BECAUSE THAT IS WHAT SHE HAS BEEN ON --

## 2023-04-26 DIAGNOSIS — E03.9 ACQUIRED HYPOTHYROIDISM: ICD-10-CM

## 2023-05-02 DIAGNOSIS — E03.9 ACQUIRED HYPOTHYROIDISM: ICD-10-CM

## 2023-05-02 NOTE — TELEPHONE ENCOUNTER
----- Message from Mervat Pritchard sent at 5/2/2023  2:53 PM CDT -----  Contact: self  Type:  RX Refill Request    Who Called: Brigitte Bell  Refill or New Rx:refill  RX Name and Strength:levothyroxine (SYNTHROID) 100 MCG tablet  How is the patient currently taking it? (ex. 1XDay):daily  Is this a 30 day or 90 day RX:90  Preferred Pharmacy with phone number:  [facility] [id number] - [place], [place] - [address]  [address]  [place] [address]  Phone: [phone] Fax: [phone]    Local or Mail Order:local  Ordering Provider:sari davey  Would the patient rather a call back or a response via [facility]? Call back  Best Call Back Number:[phone]  Additional Information: n/a

## 2023-05-03 RX ORDER — LEVOTHYROXINE SODIUM 100 UG/1
TABLET ORAL
Qty: 90 TABLET | Refills: 3 | Status: SHIPPED | OUTPATIENT
Start: 2023-05-03 | End: 2024-01-12 | Stop reason: SDUPTHER

## 2023-05-05 RX ORDER — LEVOTHYROXINE SODIUM 100 UG/1
100 TABLET ORAL
Qty: 30 TABLET | Refills: 11 | OUTPATIENT
Start: 2023-05-05 | End: 2024-05-04

## 2023-05-23 ENCOUNTER — PATIENT MESSAGE (OUTPATIENT)
Dept: RESEARCH | Facility: HOSPITAL | Age: 73
End: 2023-05-23
Payer: MEDICARE

## 2023-06-28 LAB — BCS RECOMMENDATION EXT: NORMAL

## 2023-06-29 ENCOUNTER — PATIENT OUTREACH (OUTPATIENT)
Dept: ADMINISTRATIVE | Facility: HOSPITAL | Age: 73
End: 2023-06-29
Payer: MEDICARE

## 2023-06-30 ENCOUNTER — PATIENT MESSAGE (OUTPATIENT)
Dept: PRIMARY CARE CLINIC | Facility: CLINIC | Age: 73
End: 2023-06-30
Payer: MEDICARE

## 2023-07-12 ENCOUNTER — OFFICE VISIT (OUTPATIENT)
Dept: PRIMARY CARE CLINIC | Facility: CLINIC | Age: 73
End: 2023-07-12
Payer: MEDICARE

## 2023-07-12 VITALS
HEIGHT: 63 IN | BODY MASS INDEX: 27.36 KG/M2 | WEIGHT: 154.38 LBS | DIASTOLIC BLOOD PRESSURE: 72 MMHG | SYSTOLIC BLOOD PRESSURE: 150 MMHG

## 2023-07-12 DIAGNOSIS — E03.9 ACQUIRED HYPOTHYROIDISM: Primary | ICD-10-CM

## 2023-07-12 DIAGNOSIS — E78.5 HYPERLIPIDEMIA, UNSPECIFIED HYPERLIPIDEMIA TYPE: ICD-10-CM

## 2023-07-12 DIAGNOSIS — K58.0 IRRITABLE BOWEL SYNDROME WITH DIARRHEA: ICD-10-CM

## 2023-07-12 LAB
T4 FREE SP9 P CHAL SERPL-SCNC: 1.1 NG/DL (ref 0.76–1.46)
TSH SERPL DL<=0.005 MIU/L-ACNC: 3.19 UIU/ML (ref 0.36–3.74)

## 2023-07-12 PROCEDURE — 99214 PR OFFICE/OUTPT VISIT, EST, LEVL IV, 30-39 MIN: ICD-10-PCS | Mod: S$GLB,,, | Performed by: INTERNAL MEDICINE

## 2023-07-12 PROCEDURE — 99214 OFFICE O/P EST MOD 30 MIN: CPT | Mod: S$GLB,,, | Performed by: INTERNAL MEDICINE

## 2023-07-12 NOTE — PROGRESS NOTES
Subjective:      Patient ID: Brigitte Bell is a 72 y.o. female.    Chief Complaint: Follow-up (IBS and is having trouble but will see Dr Nugent 07/24/23. )    HPI    Past Medical History:   Diagnosis Date    Cystitis     Diaphragmatic hernia     Diarrhea     GERD (gastroesophageal reflux disease)     Hypertension     IBS (irritable bowel syndrome)     Insomnia     Stomach cramps     Thyroid disease      Patient here for regular 6 month follow up    She has a diagnosis of IBS and was seeing Dr Keith at [facility] and all imagine testing etc has been done and now she would like to establish with GI here. She has an appointment scheduled with Dr Nugent   She states she has diarrhea in the beginning of the day. She also had a cholecystectomy and is on Questran but does not take it daily  She does report some formed stool during the day  She  is on 25 mg of Elavil nightly and has enough refills  She does not want any other anti depressant at this time  Abdominal cramping is controlled with levsin  H/o arrhythmia on flecainide         Review of Systems   Constitutional:  Negative for chills and fever.   HENT:  Negative for hearing loss.    Eyes:  Negative for blurred vision.   Respiratory:  Negative for cough, shortness of breath and wheezing.    Cardiovascular:  Negative for chest pain, palpitations and leg swelling.   Gastrointestinal:  Positive for abdominal pain and diarrhea. Negative for blood in stool, constipation, melena, nausea and vomiting.   Genitourinary:  Negative for dysuria, frequency and urgency.   Musculoskeletal:  Negative for falls.   Skin:  Negative for rash.   Neurological:  Negative for dizziness and headaches.   Endo/Heme/Allergies:  Does not bruise/bleed easily.   Psychiatric/Behavioral:  Negative for depression. The patient is not nervous/anxious.    Objective:     Physical Exam  Vitals reviewed.   Constitutional:       Appearance: Normal appearance.   HENT:      Head: Normocephalic.       "Mouth/Throat:      Mouth: Mucous membranes are moist.      Pharynx: Oropharynx is clear.   Eyes:      Extraocular Movements: Extraocular movements intact.      Conjunctiva/sclera: Conjunctivae normal.      Pupils: Pupils are equal, round, and reactive to light.   Cardiovascular:      Rate and Rhythm: Normal rate and regular rhythm.   Pulmonary:      Effort: Pulmonary effort is normal.      Breath sounds: Normal breath sounds.   Abdominal:      General: Bowel sounds are normal.   Musculoskeletal:      Right lower leg: No edema.      Left lower leg: No edema.   Skin:     General: Skin is warm.      Capillary Refill: Capillary refill takes less than 2 seconds.   Neurological:      General: No focal deficit present.      Mental Status: She is alert and oriented to person, place, and time.   Psychiatric:         Mood and Affect: Mood normal.     BP (!) 150/72 (BP Location: Right arm, Patient Position: Sitting, BP Method: Medium (Manual))   Ht 5' 3" (1.6 m)   Wt 70 kg (154 lb 6.4 oz)   BMI 27.35 kg/m²     Assessment:       ICD-10-CM ICD-9-CM   1. Acquired hypothyroidism  E03.9 244.9   2. Irritable bowel syndrome with diarrhea  K58.0 564.1   3. Hyperlipidemia, unspecified hyperlipidemia type  E78.5 272.4       Plan:     Medication List with Changes/Refills   Current Medications    AMITRIPTYLINE (ELAVIL) 25 MG TABLET    Take 1 tablet (25 mg total) by mouth every evening.    AMLODIPINE (NORVASC) 2.5 MG TABLET    Take 2.5 mg by mouth 2 (two) times daily.    CARVEDILOL (COREG) 6.25 MG TABLET    Take 6.25 mg by mouth 2 (two) times daily.    DICLOFENAC SODIUM (VOLTAREN) 1 % GEL    Apply 2 g topically as needed.    ESTRADIOL 10 MCG INPK    0.01% VAGINAL VREAM- APPL Q HAS Strength: 10 mcg    FLECAINIDE (TAMBOCOR) 50 MG TAB    TK 1 T PO BID    HYOSCYAMINE (ANASPAZ,LEVSIN) 0.125 MG TAB    Take 125 mcg by mouth every 4 (four) hours as needed.    LEVOTHYROXINE (SYNTHROID) 100 MCG TABLET    TAKE ONE TABLET BY MOUTH EVERY MORNING " BEFORE BREAKFAST    MEDROXYPROGESTERONE (PROVERA) 5 MG TABLET    Take 1 tablet (5 mg total) by mouth once daily.    MINOXIDIL (ROGAINE) 2 % EXTERNAL SOLUTION    Apply topically 2 (two) times daily. Can get otc, apply very small film to avoid low BP    ONDANSETRON (ZOFRAN) 4 MG TABLET    Take 4 mg by mouth as needed.   Discontinued Medications    OMEPRAZOLE-SODIUM BICARBONATE (ZEGERID) 40-1.1 MG-GRAM PER CAPSULE    Take 1 capsule by mouth before breakfast.        1. Acquired hypothyroidism  -     TSH; Future; Expected date: 07/12/2023  -     T4, Free; Future; Expected date: 07/12/2023    2. Irritable bowel syndrome with diarrhea    3. Hyperlipidemia, unspecified hyperlipidemia type        Future Appointments   Date Time Provider Department Center   7/24/2023  7:00 AM Ольга Nugent MD [facility]  [place]   1/12/2024  2:00 PM Lotus Berger MD [id number] [facility]

## 2023-07-14 ENCOUNTER — PATIENT MESSAGE (OUTPATIENT)
Dept: PRIMARY CARE CLINIC | Facility: CLINIC | Age: 73
End: 2023-07-14
Payer: MEDICARE

## 2023-07-18 RX ORDER — ESTRADIOL 0.1 MG/G
CREAM VAGINAL
Qty: 42.5 G | Refills: 3 | Status: SHIPPED | OUTPATIENT
Start: 2023-07-18 | End: 2024-01-12 | Stop reason: SDUPTHER

## 2023-07-21 NOTE — PROGRESS NOTES
Clinic Note    Reason for visit:  The primary encounter diagnosis was Gastric intestinal metaplasia. Diagnoses of Florentino's esophagus without dysplasia, Gastroesophageal reflux disease, unspecified whether esophagitis present, Irritable bowel syndrome with diarrhea, and Incontinence of feces, unspecified fecal incontinence type were also pertinent to this visit.    PCP: Lotus Berger       HPI:  This is a 72 y.o. female here to be established. Here for IBS-D. Previously was seeing Dr. Butt. Was diagnosed with IBS-D for the past 20 years. She has BM daily but will have 1 episodes of watery diarrhea every other day. Takes immodium as needed. She has taken questran in the past but feels like imodium works better. She has episodes of incontinence with the bouts of diarrhea. Has very high fiber diet. Denies rectal bleeding. She is on Zegrid as needed for Barretts. She denies any heartburn/reflux, n/v, abdominal pain, melena. She had GIM on last EGD- has never had gastric mapping.    7/14/2023- normal TSH/T4    1/12/2023 CBC WNL, NA 132L, TtgIgA negative    EGD/Colonoscopy 4/20/2022 with Dr. Keith-small HH, irregular GE jxn,(bx Barretts neg for dysplasia) gastritis (bx- gastritis with IM neg for h.pylori)-DBX- Peptic duodenitis; RCB, diverticulosis- NO path report  EGD done 6/4/2020- mucosa suspicious for BE, gastritis.  Colonoscopy done 9/6/2018 normal TI- RCB, Severe diverticulosis of sigmoid and descending, gr I IH- no path report.      Review of Systems   Constitutional:  Negative for fatigue, fever and unexpected weight change.   HENT:  Negative for mouth sores, postnasal drip, sore throat and trouble swallowing.    Eyes:  Negative for pain, discharge and eye dryness.   Respiratory:  Negative for apnea, cough, choking, chest tightness, shortness of breath and wheezing.    Cardiovascular:  Positive for leg swelling. Negative for chest pain and palpitations.   Gastrointestinal:  Positive for diarrhea and fecal  incontinence. Negative for abdominal distention, abdominal pain, anal bleeding, blood in stool, change in bowel habit, constipation, nausea, rectal pain, vomiting, reflux and change in bowel habit.   Genitourinary:  Negative for bladder incontinence, dysuria and hematuria.   Musculoskeletal:  Negative for arthralgias, back pain and joint swelling.   Integumentary:  Negative for color change and rash.   Allergic/Immunologic: Negative for environmental allergies and food allergies.   Neurological:  Negative for seizures and headaches.   Hematological:  Negative for adenopathy. Does not bruise/bleed easily.      Past Medical History:   Diagnosis Date    Cystitis     Diaphragmatic hernia     Diarrhea     GERD (gastroesophageal reflux disease)     Hypertension     IBS (irritable bowel syndrome)     Insomnia     Stomach cramps     Thyroid disease      Past Surgical History:   Procedure Laterality Date    CHOLECYSTECTOMY      COLONOSCOPY      2020    ESOPHAGOGASTRODUODENOSCOPY  04/20/2022    GASTROSCOPY       Family History   Problem Relation Age of Onset    Hypertension Father     Diabetes Mother     Hypertension Mother      Social History     Tobacco Use    Smoking status: Never    Smokeless tobacco: Never   Substance Use Topics    Alcohol use: Not Currently    Drug use: Not Currently     Review of patient's allergies indicates:  No Known Allergies   Medication List with Changes/Refills   New Medications    RIFAXIMIN (XIFAXAN) 550 MG TAB    Take 1 tablet (550 mg total) by mouth 3 (three) times daily. for 14 days   Current Medications    AMITRIPTYLINE (ELAVIL) 25 MG TABLET    Take 1 tablet (25 mg total) by mouth every evening.    AMLODIPINE (NORVASC) 2.5 MG TABLET    Take 2.5 mg by mouth 2 (two) times daily.    CARVEDILOL (COREG) 12.5 MG TABLET        DICLOFENAC SODIUM (VOLTAREN) 1 % GEL    Apply 2 g topically as needed.    ESTRADIOL (ESTRACE) 0.01 % (0.1 MG/GRAM) VAGINAL CREAM    INSERT 1 GRAM VAGINALLY EVERY NIGHT AT  "BEDTIME    FLECAINIDE (TAMBOCOR) 50 MG TAB    TK 1 T PO BID    HYOSCYAMINE (LEVSIN/SL) 0.125 MG SUBL        LEVOTHYROXINE (SYNTHROID) 100 MCG TABLET    TAKE ONE TABLET BY MOUTH EVERY MORNING BEFORE BREAKFAST    MEDROXYPROGESTERONE (PROVERA) 5 MG TABLET    Take 1 tablet (5 mg total) by mouth once daily.    METH/MEBLUE/SOD PHOS/PSAL/HYOS (URIBEL ORAL)    Take by mouth.    MINOXIDIL (ROGAINE) 2 % EXTERNAL SOLUTION    Apply topically 2 (two) times daily. Can get otc, apply very small film to avoid low BP    OMEPRAZOLE-SODIUM BICARBONATE (ZEGERID) 40-1.1 MG-GRAM PER CAPSULE       Discontinued Medications    CARVEDILOL (COREG) 6.25 MG TABLET    Take 6.25 mg by mouth 2 (two) times daily.    ESTRADIOL 10 MCG INPK    0.01% VAGINAL VREAM- APPL Q HAS Strength: 10 mcg    HYOSCYAMINE (ANASPAZ,LEVSIN) 0.125 MG TAB    Take 125 mcg by mouth every 4 (four) hours as needed.    ONDANSETRON (ZOFRAN) 4 MG TABLET    Take 4 mg by mouth as needed.         Vital Signs:  BP (!) 156/82   Pulse 67   Ht 5' 3" (1.6 m)   Wt 69.4 kg (153 lb)   SpO2 98%   BMI 27.10 kg/m²        Physical Exam  Vitals reviewed.   Constitutional:       General: She is awake. She is not in acute distress.     Appearance: Normal appearance. She is well-developed. She is not ill-appearing, toxic-appearing or diaphoretic.   HENT:      Head: Normocephalic and atraumatic.      Nose: Nose normal.      Mouth/Throat:      Mouth: Mucous membranes are moist.      Pharynx: Oropharynx is clear. No oropharyngeal exudate or posterior oropharyngeal erythema.   Eyes:      General: Lids are normal. Gaze aligned appropriately. No scleral icterus.        Right eye: No discharge.         Left eye: No discharge.      Conjunctiva/sclera: Conjunctivae normal.   Neck:      Trachea: Trachea normal.   Cardiovascular:      Rate and Rhythm: Normal rate and regular rhythm.      Pulses:           Radial pulses are 2+ on the right side and 2+ on the left side.   Pulmonary:      Effort: " Pulmonary effort is normal. No respiratory distress.      Breath sounds: No stridor. No wheezing.   Chest:      Chest wall: No tenderness.   Abdominal:      General: Bowel sounds are normal. There is no distension.      Palpations: Abdomen is soft. There is no fluid wave, hepatomegaly or mass.      Tenderness: There is no abdominal tenderness. There is no guarding or rebound.   Musculoskeletal:         General: No tenderness or deformity.      Cervical back: Full passive range of motion without pain and neck supple. No tenderness.      Right lower leg: No edema.      Left lower leg: No edema.   Lymphadenopathy:      Cervical: No cervical adenopathy.   Skin:     General: Skin is warm and dry.      Capillary Refill: Capillary refill takes less than 2 seconds.      Coloration: Skin is not cyanotic, jaundiced or pale.   Neurological:      General: No focal deficit present.      Mental Status: She is alert and oriented to person, place, and time.      Motor: No tremor.   Psychiatric:         Attention and Perception: Attention normal.         Mood and Affect: Mood and affect normal.         Speech: Speech normal.         Behavior: Behavior normal. Behavior is cooperative.          All of the data above and below has been reviewed by myself and any further interpretations will be reflected in the assessment and plan.   The data includes review of external notes, and independent interpretation of lab results, procedures, x-rays, and imaging reports.      Assessment:  Gastric intestinal metaplasia  -     Ambulatory Referral to External Surgery    Florentino's esophagus without dysplasia  -     Ambulatory Referral to External Surgery    Gastroesophageal reflux disease, unspecified whether esophagitis present  -     Ambulatory Referral to External Surgery    Irritable bowel syndrome with diarrhea  -     Clostridium difficile EIA; Future; Expected date: 07/24/2023  -     Pancreatic elastase, fecal; Future; Expected date:  07/24/2023  -     Giardia / Cryptosporidum, EIA; Future; Expected date: 07/24/2023  -     rifAXIMin (XIFAXAN) 550 mg Tab; Take 1 tablet (550 mg total) by mouth 3 (three) times daily. for 14 days  Dispense: 42 tablet; Refill: 0    Incontinence of feces, unspecified fecal incontinence type    GIM- She has never had GIM Will plan for EGD w/ gastric mapping  IBS-D  has been on questran many years without relief, She has also been on amitriptyline, levsin as needed.- will send for stool tests if negative will try xifaxan x 14 days.- Discussed with patient trying of colestipol.      Recommendations:    Schedule for EGD with Dr. Raffi song gastric mapping  Complete stool tests  Trial of xifaxin 550mg three times a day x 14 days      Risks, benefits, and alternatives of medical management, any associated procedures, and/or treatment discussed with the patient. Patient given opportunity to ask questions and voices understanding. Patient has elected to proceed with the recommended care modalities as discussed.        Order summary:  Orders Placed This Encounter   Procedures    Clostridium difficile EIA    Pancreatic elastase, fecal    Giardia / Cryptosporidum, EIA    Ambulatory Referral to External Surgery        Instructed patient to notify my office if they have not been contacted within two weeks after any procedures, submitting any samples (biopsies, blood, stool, urine, etc.) or after any imaging (X-ray, CT, MRI, etc.).      Gala Bailey NP    This document may have been created using a voice recognition transcribing system. Incorrect words or phrases may have been missed during proofreading. Please interpret accordingly or contact me for clarification.

## 2023-07-24 ENCOUNTER — OFFICE VISIT (OUTPATIENT)
Dept: GASTROENTEROLOGY | Facility: CLINIC | Age: 73
End: 2023-07-24
Payer: MEDICARE

## 2023-07-24 VITALS
DIASTOLIC BLOOD PRESSURE: 82 MMHG | SYSTOLIC BLOOD PRESSURE: 156 MMHG | BODY MASS INDEX: 27.11 KG/M2 | HEIGHT: 63 IN | OXYGEN SATURATION: 98 % | WEIGHT: 153 LBS | HEART RATE: 67 BPM

## 2023-07-24 DIAGNOSIS — K21.9 GASTROESOPHAGEAL REFLUX DISEASE, UNSPECIFIED WHETHER ESOPHAGITIS PRESENT: ICD-10-CM

## 2023-07-24 DIAGNOSIS — R15.9 INCONTINENCE OF FECES, UNSPECIFIED FECAL INCONTINENCE TYPE: ICD-10-CM

## 2023-07-24 DIAGNOSIS — K31.A0 GASTRIC INTESTINAL METAPLASIA: Primary | ICD-10-CM

## 2023-07-24 DIAGNOSIS — K58.0 IRRITABLE BOWEL SYNDROME WITH DIARRHEA: ICD-10-CM

## 2023-07-24 DIAGNOSIS — K22.70 BARRETT'S ESOPHAGUS WITHOUT DYSPLASIA: ICD-10-CM

## 2023-07-24 PROCEDURE — 99204 OFFICE O/P NEW MOD 45 MIN: CPT | Mod: S$GLB,,, | Performed by: NURSE PRACTITIONER

## 2023-07-24 PROCEDURE — 99204 PR OFFICE/OUTPT VISIT, NEW, LEVL IV, 45-59 MIN: ICD-10-PCS | Mod: S$GLB,,, | Performed by: NURSE PRACTITIONER

## 2023-07-24 RX ORDER — OMEPRAZOLE AND SODIUM BICARBONATE 40; 1100 MG/1; MG/1
CAPSULE ORAL
COMMUNITY
Start: 2023-07-17 | End: 2024-02-26

## 2023-07-24 RX ORDER — HYOSCYAMINE SULFATE 0.12 MG/1
TABLET SUBLINGUAL
COMMUNITY
Start: 2023-05-25 | End: 2024-02-26 | Stop reason: SDUPTHER

## 2023-07-24 RX ORDER — CARVEDILOL 12.5 MG/1
TABLET ORAL
COMMUNITY
Start: 2023-07-10

## 2023-07-24 NOTE — PATIENT INSTRUCTIONS
Schedule for EGD with Dr. Nugent  Complete stool tests  Start xifaxin 550mg three times a day x 14 days    Please notify my office if you have not been contacted within two weeks after any procedures, submitting any samples (biopsies, blood, stool, urine, etc.) or after any imaging (X-ray, CT, MRI, etc.).

## 2023-07-27 LAB
APPEARANCE SNV: NORMAL
APPEARANCE SNV: NORMAL
C DIFF TOXIN: NEGATIVE
COLOR UR: NORMAL
CRYPTOSPORIDIUM DNA: NOT DETECTED
ENTAMOEBA HISTOLYTICA: NOT DETECTED
GIARDIA LAMBLIA DNA: NOT DETECTED
SOURCE: NORMAL

## 2023-08-02 LAB — PANCREATIC ELASTASE 1: 461 MCG/G

## 2023-08-03 ENCOUNTER — PATIENT OUTREACH (OUTPATIENT)
Dept: ADMINISTRATIVE | Facility: HOSPITAL | Age: 73
End: 2023-08-03
Payer: MEDICARE

## 2023-08-03 ENCOUNTER — TELEPHONE (OUTPATIENT)
Dept: GASTROENTEROLOGY | Facility: CLINIC | Age: 73
End: 2023-08-03
Payer: MEDICARE

## 2023-08-03 NOTE — TELEPHONE ENCOUNTER
7/26/2023 C.diff, giardia, crypto- neg. Fecal elastase 461    Call with results. Her stool tests were negative for infection or parasite. It also showed that her pancreas is functioning well. Was she able to get the xifaxan prescription?  KN

## 2023-09-01 NOTE — TELEPHONE ENCOUNTER
Patient was notified and updated regarding her upcoming procedure with NBP on 9/28/23. Procedure will be at [facility](our new gi lab) instead of the [facility]. Patient voiced understanding. -dmp

## 2023-09-21 ENCOUNTER — TELEPHONE (OUTPATIENT)
Dept: GASTROENTEROLOGY | Facility: CLINIC | Age: 73
End: 2023-09-21
Payer: MEDICARE

## 2023-09-21 VITALS — WEIGHT: 153 LBS | BODY MASS INDEX: 27.11 KG/M2 | HEIGHT: 63 IN

## 2023-09-21 DIAGNOSIS — K22.70 BARRETT'S ESOPHAGUS WITHOUT DYSPLASIA: ICD-10-CM

## 2023-09-21 DIAGNOSIS — K21.9 GASTROESOPHAGEAL REFLUX DISEASE, UNSPECIFIED WHETHER ESOPHAGITIS PRESENT: ICD-10-CM

## 2023-09-21 DIAGNOSIS — K31.A0 GASTRIC INTESTINAL METAPLASIA: Primary | ICD-10-CM

## 2023-09-21 NOTE — TELEPHONE ENCOUNTER
"[facility] [facility]  [address] [place] [address]  Phone: [phone]  Fax: [phone]    History & Physical         Provider: Dr. Ольга Nugent    Patient Name: Brigitte OSPINA (age):1950  72 y.o.           Gender: female   Phone: [phone]     Referring Physician: Lotus Berger     Vital Signs:   Height - 5' 3"  Weight - 153 lb  BMI -  27.10    Plan: EGD @ [facility]    Encounter Diagnoses   Name Primary?    Gastric intestinal metaplasia Yes    Florentino's esophagus without dysplasia     Gastroesophageal reflux disease, unspecified whether esophagitis present            History:      Past Medical History:   Diagnosis Date    Cystitis     Diaphragmatic hernia     Diarrhea     GERD (gastroesophageal reflux disease)     Hypertension     IBS (irritable bowel syndrome)     Insomnia     Stomach cramps     Thyroid disease       Past Surgical History:   Procedure Laterality Date    CHOLECYSTECTOMY      COLONOSCOPY          ESOPHAGOGASTRODUODENOSCOPY  2022    GASTROSCOPY        Medication List with Changes/Refills   Current Medications    AMITRIPTYLINE (ELAVIL) 25 MG TABLET    Take 1 tablet (25 mg total) by mouth every evening.    AMLODIPINE (NORVASC) 2.5 MG TABLET    Take 2.5 mg by mouth 2 (two) times daily.    CARVEDILOL (COREG) 12.5 MG TABLET        DICLOFENAC SODIUM (VOLTAREN) 1 % GEL    Apply 2 g topically as needed.    ESTRADIOL (ESTRACE) 0.01 % (0.1 MG/GRAM) VAGINAL CREAM    INSERT 1 GRAM VAGINALLY EVERY NIGHT AT BEDTIME    FLECAINIDE (TAMBOCOR) 50 MG TAB    TK 1 T PO BID    HYOSCYAMINE (LEVSIN/SL) 0.125 MG SUBL        LEVOTHYROXINE (SYNTHROID) 100 MCG TABLET    TAKE ONE TABLET BY MOUTH EVERY MORNING BEFORE BREAKFAST    MEDROXYPROGESTERONE (PROVERA) 5 MG TABLET    Take 1 tablet (5 mg total) by mouth once daily.    METH/MEBLUE/SOD PHOS/PSAL/HYOS (URIBEL ORAL)    Take by mouth.    MINOXIDIL " (ROGAINE) 2 % EXTERNAL SOLUTION    Apply topically 2 (two) times daily. Can get otc, apply very small film to avoid low BP    OMEPRAZOLE-SODIUM BICARBONATE (ZEGERID) 40-1.1 MG-GRAM PER CAPSULE          Review of patient's allergies indicates:  No Known Allergies   Family History   Problem Relation Age of Onset    Hypertension Father     Diabetes Mother     Hypertension Mother       Social History     Tobacco Use    Smoking status: Never    Smokeless tobacco: Never   Substance Use Topics    Alcohol use: Not Currently    Drug use: Not Currently        Physical Examination:     General Appearance:___________________________  HEENT: _____________________________________  Abdomen:____________________________________  Heart:________________________________________  Lungs:_______________________________________  Extremities:___________________________________  Skin:_________________________________________  Endocrine:____________________________________  Genitourinary:_________________________________  Neurological:__________________________________      Patient has been evaluated immediately prior to sedation and is medically cleared for endoscopy with IVCS as an ASA class: ______      Physician Signature: _________________________       Date: ________  Time: ________

## 2023-09-28 ENCOUNTER — OUTSIDE PLACE OF SERVICE (OUTPATIENT)
Dept: GASTROENTEROLOGY | Facility: CLINIC | Age: 73
End: 2023-09-28

## 2023-09-28 PROCEDURE — 43239 PR EGD, FLEX, W/BIOPSY, SGL/MULTI: ICD-10-PCS | Mod: ,,, | Performed by: INTERNAL MEDICINE

## 2023-09-28 PROCEDURE — 43239 EGD BIOPSY SINGLE/MULTIPLE: CPT | Mod: ,,, | Performed by: INTERNAL MEDICINE

## 2023-09-29 LAB — SPECIMEN TO PATHOLOGY: NORMAL

## 2023-10-01 ENCOUNTER — TELEPHONE (OUTPATIENT)
Dept: GASTROENTEROLOGY | Facility: CLINIC | Age: 73
End: 2023-10-01
Payer: MEDICARE

## 2023-10-02 NOTE — TELEPHONE ENCOUNTER
FBx focal mini act mild chr gitis, gastric mapping otherwise: mild chr inact gitis w/o Hp. Distal EBx reflux w/o BE.     Notify patient. No signs of infection, precancerous cells or BE on the upper endoscopy biopsies.  Notify me if any issues before her follow up OV with me.  NBP

## 2023-11-09 ENCOUNTER — TELEPHONE (OUTPATIENT)
Dept: GASTROENTEROLOGY | Facility: CLINIC | Age: 73
End: 2023-11-09

## 2023-11-09 NOTE — TELEPHONE ENCOUNTER
----- Message from Alfreda Chahal sent at 11/9/2023 10:52 AM CST -----  Regarding: pt wanting same day appt  Contact: self    ----- Message -----  From: Crissy Portillo  Sent: 11/9/2023  10:43 AM CST  To: [facility] Staff    Type:  Same Day Appointment Request    Caller is requesting a same day appointment.  Caller declined first available appointment listed below.    Name of Caller: Brigitte Bell   When is the first available appointment? None showing  Symptoms: Abdominal cramping  Best Call Back Number: [phone]   Additional Information: MRN: [id number] / [facility]

## 2023-12-01 ENCOUNTER — TELEPHONE (OUTPATIENT)
Dept: GASTROENTEROLOGY | Facility: CLINIC | Age: 73
End: 2023-12-01
Payer: MEDICARE

## 2023-12-01 NOTE — TELEPHONE ENCOUNTER
----- Message from Ruthann Daniels sent at 12/1/2023  1:50 PM CST -----  Type:  Needs Medical Advice    Who Called: Brigitte Bell  Symptoms (please be specific): -   How long has patient had these symptoms:  -  Pharmacy name and phone #:  -  Would the patient rather a call back or a response via [facility]?    Best Call Back Number: [phone]    Additional Information:   pt says the pathology lab will be sending over a request to have the billing coded correctly

## 2023-12-07 ENCOUNTER — TELEPHONE (OUTPATIENT)
Dept: GASTROENTEROLOGY | Facility: CLINIC | Age: 73
End: 2023-12-07
Payer: MEDICARE

## 2023-12-07 NOTE — TELEPHONE ENCOUNTER
Received document from [facility] saying more codes needed for enteric parasite panel. Spoke with Karen at [facility] and stated that we did not order enteric parasite pancel- crypt/giar was ordered. She will call us back and let us know what needs to be done.  KEVIN

## 2023-12-11 RX ORDER — AMITRIPTYLINE HYDROCHLORIDE 25 MG/1
25 TABLET, FILM COATED ORAL NIGHTLY
Qty: 90 TABLET | Refills: 3 | Status: SHIPPED | OUTPATIENT
Start: 2023-12-11

## 2023-12-14 ENCOUNTER — PATIENT MESSAGE (OUTPATIENT)
Dept: ADMINISTRATIVE | Facility: HOSPITAL | Age: 73
End: 2023-12-14
Payer: MEDICARE

## 2023-12-14 ENCOUNTER — PATIENT OUTREACH (OUTPATIENT)
Dept: ADMINISTRATIVE | Facility: HOSPITAL | Age: 73
End: 2023-12-14
Payer: MEDICARE

## 2023-12-14 NOTE — PROGRESS NOTES
OHN HTN REPORT-last documented bp was not at goal, attempted to contact pt for remote bp, noand, no vm, will send pt a portal message.

## 2023-12-21 ENCOUNTER — TELEPHONE (OUTPATIENT)
Dept: PRIMARY CARE CLINIC | Facility: CLINIC | Age: 73
End: 2023-12-21
Payer: MEDICARE

## 2023-12-21 ENCOUNTER — PATIENT MESSAGE (OUTPATIENT)
Dept: PRIMARY CARE CLINIC | Facility: CLINIC | Age: 73
End: 2023-12-21
Payer: MEDICARE

## 2023-12-21 ENCOUNTER — TELEPHONE (OUTPATIENT)
Dept: GASTROENTEROLOGY | Facility: CLINIC | Age: 73
End: 2023-12-21
Payer: MEDICARE

## 2023-12-21 DIAGNOSIS — E03.9 ACQUIRED HYPOTHYROIDISM: ICD-10-CM

## 2023-12-21 DIAGNOSIS — E78.5 HYPERLIPIDEMIA, UNSPECIFIED HYPERLIPIDEMIA TYPE: ICD-10-CM

## 2023-12-21 DIAGNOSIS — I10 HYPERTENSION, UNSPECIFIED TYPE: Primary | ICD-10-CM

## 2023-12-21 NOTE — TELEPHONE ENCOUNTER
----- Message from Lotus Berger MD sent at 12/21/2023  1:15 PM CST -----  Contact: Patient  Ordered      ----- Message -----  From: Sabiha Burks MA  Sent: 12/21/2023  10:30 AM CST  To: Lotus Berger MD      ----- Message -----  From: Alexandra Bella  Sent: 12/21/2023   9:23 AM CST  To: Ab Gautam Staff    Patient called to consult with nurse or staff regarding her upcoming appointment. She states she would like to have labs done prior to the appointment and would like orders. She would luna a call back regarding this and can be reached at [phone]. Thanks/

## 2023-12-21 NOTE — TELEPHONE ENCOUNTER
Staff called spoke to pt.. she advised the lab Enteric Parasite Panel wasn't covered by insurance... staff advised pt to call her insurance to see why it wasn't covered.  She advised she didn't want to call them until the code got changed.,  staff advised pt the diagnosis code used was for the diagnosis given for condition.   Pt spouse was in the background telling her the lab didn't send all the paperwork to the insurance company.. staff advised her to contact the lab

## 2023-12-21 NOTE — TELEPHONE ENCOUNTER
----- Message from Akua Gibbs sent at 12/21/2023  2:36 PM CST -----  Contact: Brigitte Thornton is calling concerning a bill that was to be recoded. Please callback @ [phone]

## 2023-12-22 ENCOUNTER — CLINICAL SUPPORT (OUTPATIENT)
Dept: OBSTETRICS AND GYNECOLOGY | Facility: CLINIC | Age: 73
End: 2023-12-22
Payer: MEDICARE

## 2023-12-22 DIAGNOSIS — Z01.89 ROUTINE LAB DRAW: Primary | ICD-10-CM

## 2023-12-22 LAB
ABS NRBC COUNT: 0 THOU/UL (ref 0–0.01)
ABSOLUTE BASOPHIL: 0 10*3/UL (ref 0–0.3)
ABSOLUTE EOSINOPHIL: 0.1 10*3/UL (ref 0–0.6)
ABSOLUTE IMMATURE GRAN: 0.02 THOU/UL (ref 0–0.03)
ABSOLUTE LYMPHOCYTE: 2 10*3/UL (ref 1.2–4)
ABSOLUTE MONOCYTE: 0.5 10*3/UL (ref 0.1–0.8)
ALBUMIN SERPL BCP-MCNC: 3.7 G/DL (ref 3.4–5)
ALP SERPL-CCNC: 65 U/L (ref 45–117)
ALT SERPL W P-5'-P-CCNC: 24 U/L (ref 13–56)
ANION GAP SERPL CALC-SCNC: 6 MMOL/L (ref 3–11)
AST SERPL-CCNC: 17 U/L (ref 15–37)
BASOPHILS NFR BLD: 0.5 % (ref 0–3)
BILIRUB SERPL-MCNC: 0.9 MG/DL (ref 0.2–1)
BUN SERPL-MCNC: 12 MG/DL (ref 7–18)
BUN/CREAT SERPL: 16.9 RATIO
CALCIUM SERPL-MCNC: 9.1 MG/DL (ref 8.5–10.1)
CHLORIDE SERPL-SCNC: 99 MMOL/L (ref 98–107)
CHOLEST SERPL-MSCNC: 221 MG/DL
CO2 SERPL-SCNC: 29 MMOL/L (ref 21–32)
CREAT SERPL-MCNC: 0.71 MG/DL (ref 0.55–1.02)
EOSINOPHIL NFR BLD: 1.9 % (ref 0–6)
ERYTHROCYTE [DISTWIDTH] IN BLOOD BY AUTOMATED COUNT: 13.6 % (ref 0–15.5)
GFR ESTIMATION: > 60
GLUCOSE SERPL-MCNC: 97 MG/DL (ref 74–106)
HCT VFR BLD AUTO: 41.9 % (ref 37–47)
HDLC SERPL-MCNC: 88 MG/DL
HGB BLD-MCNC: 14.2 G/DL (ref 12–16)
IMMATURE GRANULOCYTES: 0.3 % (ref 0–0.43)
LDLC SERPL CALC-MCNC: 120 MG/DL
LYMPHOCYTES NFR BLD: 34.3 % (ref 20–45)
MCH RBC QN AUTO: 29.7 PG (ref 27–32)
MCHC RBC AUTO-ENTMCNC: 33.9 % (ref 32–36)
MCV RBC AUTO: 87.7 FL (ref 80–99)
MONOCYTES NFR BLD: 9 % (ref 2–10)
NEUTROPHILS # BLD AUTO: 3.2 10*3/UL (ref 1.4–7)
NEUTROPHILS NFR BLD: 54 % (ref 50–80)
NUCLEATED RED BLOOD CELLS: 0 % (ref 0–0.2)
PLATELETS: 191 10*3/UL (ref 130–400)
PMV BLD AUTO: 10.9 FL (ref 9.2–12.2)
POTASSIUM SERPL-SCNC: 4.5 MMOL/L (ref 3.5–5.1)
PROT SERPL-MCNC: 6.9 G/DL (ref 6.4–8.2)
RBC # BLD AUTO: 4.78 10*6/UL (ref 4.2–5.4)
SODIUM BLD-SCNC: 134 MMOL/L (ref 131–143)
T4 FREE SP9 P CHAL SERPL-SCNC: 1.15 NG/DL (ref 0.76–1.46)
TRIGL SERPL-MCNC: 65 MG/DL (ref 0–149)
TSH SERPL DL<=0.005 MIU/L-ACNC: 2.12 UIU/ML (ref 0.36–3.74)
VLDL CHOLESTEROL: 13 MG/DL
WBC # BLD: 5.9 10*3/UL (ref 4.5–10)

## 2023-12-22 PROCEDURE — 36415 COLL VENOUS BLD VENIPUNCTURE: CPT | Mod: S$GLB,,, | Performed by: INTERNAL MEDICINE

## 2023-12-22 PROCEDURE — 36415 PR COLLECTION VENOUS BLOOD,VENIPUNCTURE: ICD-10-PCS | Mod: S$GLB,,, | Performed by: INTERNAL MEDICINE

## 2024-01-02 ENCOUNTER — PATIENT MESSAGE (OUTPATIENT)
Dept: PRIMARY CARE CLINIC | Facility: CLINIC | Age: 74
End: 2024-01-02
Payer: MEDICARE

## 2024-01-12 ENCOUNTER — OFFICE VISIT (OUTPATIENT)
Dept: PRIMARY CARE CLINIC | Facility: CLINIC | Age: 74
End: 2024-01-12
Payer: MEDICARE

## 2024-01-12 VITALS
DIASTOLIC BLOOD PRESSURE: 76 MMHG | SYSTOLIC BLOOD PRESSURE: 146 MMHG | OXYGEN SATURATION: 99 % | HEIGHT: 63 IN | WEIGHT: 163 LBS | BODY MASS INDEX: 28.88 KG/M2 | HEART RATE: 66 BPM

## 2024-01-12 DIAGNOSIS — Z78.0 POST-MENOPAUSAL: Primary | ICD-10-CM

## 2024-01-12 DIAGNOSIS — I49.9 CARDIAC ARRHYTHMIA, UNSPECIFIED CARDIAC ARRHYTHMIA TYPE: ICD-10-CM

## 2024-01-12 DIAGNOSIS — E78.5 HYPERLIPIDEMIA, UNSPECIFIED HYPERLIPIDEMIA TYPE: ICD-10-CM

## 2024-01-12 DIAGNOSIS — I10 HYPERTENSION, UNSPECIFIED TYPE: ICD-10-CM

## 2024-01-12 DIAGNOSIS — E03.9 ACQUIRED HYPOTHYROIDISM: ICD-10-CM

## 2024-01-12 PROCEDURE — 99213 OFFICE O/P EST LOW 20 MIN: CPT | Mod: S$GLB,,, | Performed by: INTERNAL MEDICINE

## 2024-01-12 RX ORDER — LEVOTHYROXINE SODIUM 100 UG/1
100 TABLET ORAL
Qty: 90 TABLET | Refills: 3 | Status: SHIPPED | OUTPATIENT
Start: 2024-01-12

## 2024-01-12 RX ORDER — ESTRADIOL 0.1 MG/G
CREAM VAGINAL
Qty: 42.5 G | Refills: 3 | Status: SHIPPED | OUTPATIENT
Start: 2024-01-12 | End: 2024-02-20

## 2024-01-12 NOTE — PROGRESS NOTES
Subjective:      Patient ID: Brigitte Bell is a 73 y.o. female.    Chief Complaint: Follow-up and Medication Refill    HPI      Patient here for follow up  Dr Jin at [facility] in [place] is her Cardiologist and increased her flecainide  States her diarrhea has improved   No acute complaints today     Review of Systems   Constitutional:  Negative for chills and fever.   HENT:  Negative for hearing loss.    Eyes:  Negative for blurred vision.   Respiratory:  Negative for cough, shortness of breath and wheezing.    Cardiovascular:  Negative for chest pain, palpitations and leg swelling.   Gastrointestinal:  Negative for abdominal pain, blood in stool, constipation, diarrhea, melena, nausea and vomiting.   Genitourinary:  Negative for dysuria, frequency and urgency.   Musculoskeletal:  Negative for falls.   Skin:  Negative for rash.   Neurological:  Negative for dizziness and headaches.   Endo/Heme/Allergies:  Does not bruise/bleed easily.   Psychiatric/Behavioral:  Negative for depression. The patient is not nervous/anxious.      Objective:     Physical Exam  Vitals reviewed.   Constitutional:       Appearance: Normal appearance.   HENT:      Head: Normocephalic.      Mouth/Throat:      Mouth: Mucous membranes are moist.      Pharynx: Oropharynx is clear.   Eyes:      Extraocular Movements: Extraocular movements intact.      Conjunctiva/sclera: Conjunctivae normal.      Pupils: Pupils are equal, round, and reactive to light.   Cardiovascular:      Rate and Rhythm: Normal rate and regular rhythm.   Pulmonary:      Effort: Pulmonary effort is normal.      Breath sounds: Normal breath sounds.   Abdominal:      General: Bowel sounds are normal.   Musculoskeletal:      Right lower leg: No edema.      Left lower leg: No edema.   Skin:     General: Skin is warm.      Capillary Refill: Capillary refill takes less than 2 seconds.   Neurological:      Mental Status: She is alert and oriented to person, place, and time.  "  Psychiatric:         Mood and Affect: Mood normal.       BP (!) 146/76 (BP Location: Right arm, Patient Position: Sitting, BP Method: Medium (Automatic))   Pulse 66   Ht 5' 3" (1.6 m)   Wt 73.9 kg (163 lb)   SpO2 99%   BMI 28.87 kg/m²     Assessment:       ICD-10-CM ICD-9-CM   1. Post-menopausal  Z78.0 V49.81   2. Acquired hypothyroidism  E03.9 244.9   3. Hyperlipidemia, unspecified hyperlipidemia type  E78.5 272.4   4. Cardiac arrhythmia, unspecified cardiac arrhythmia type  I49.9 427.9   5. Hypertension, unspecified type  I10 401.9       Plan:     Medication List with Changes/Refills   Current Medications    AMITRIPTYLINE (ELAVIL) 25 MG TABLET    TAKE ONE TABLET BY MOUTH EVERY EVENING    AMLODIPINE (NORVASC) 2.5 MG TABLET    Take 2.5 mg by mouth 2 (two) times daily.    CARVEDILOL (COREG) 12.5 MG TABLET        DICLOFENAC SODIUM (VOLTAREN) 1 % GEL    Apply 2 g topically as needed.    FLECAINIDE (TAMBOCOR) 50 MG TAB    100 mg in the AM and 50 mg at hs    HYOSCYAMINE (LEVSIN/SL) 0.125 MG SUBL        MEDROXYPROGESTERONE (PROVERA) 5 MG TABLET    Take 1 tablet (5 mg total) by mouth once daily.    METH/MEBLUE/SOD PHOS/PSAL/HYOS (URIBEL ORAL)    Take by mouth.    MINOXIDIL (ROGAINE) 2 % EXTERNAL SOLUTION    Apply topically 2 (two) times daily. Can get otc, apply very small film to avoid low BP    OMEPRAZOLE-SODIUM BICARBONATE (ZEGERID) 40-1.1 MG-GRAM PER CAPSULE       Changed and/or Refilled Medications    Modified Medication Previous Medication    ESTRADIOL (ESTRACE) 0.01 % (0.1 MG/GRAM) VAGINAL CREAM estradioL (ESTRACE) 0.01 % (0.1 mg/gram) vaginal cream       INSERT 1 GRAM VAGINALLY EVERY NIGHT AT BEDTIME    INSERT 1 GRAM VAGINALLY EVERY NIGHT AT BEDTIME    LEVOTHYROXINE (SYNTHROID) 100 MCG TABLET levothyroxine (SYNTHROID) 100 MCG tablet       Take 1 tablet (100 mcg total) by mouth before breakfast.    TAKE ONE TABLET BY MOUTH EVERY MORNING BEFORE BREAKFAST        1. Post-menopausal  -     estradioL (ESTRACE) " 0.01 % (0.1 mg/gram) vaginal cream; INSERT 1 GRAM VAGINALLY EVERY NIGHT AT BEDTIME  Dispense: 42.5 g; Refill: 3    2. Acquired hypothyroidism  -     levothyroxine (SYNTHROID) 100 MCG tablet; Take 1 tablet (100 mcg total) by mouth before breakfast.  Dispense: 90 tablet; Refill: 3    3. Hyperlipidemia, unspecified hyperlipidemia type    4. Cardiac arrhythmia, unspecified cardiac arrhythmia type    5. Hypertension, unspecified type         Future Appointments   Date Time Provider Department Center   2/23/2024  9:20 AM Bella Grady NP [facility] GASTR[facility]  [address]   7/12/2024  2:00 PM Lotus Berger MD [facility]

## 2024-02-16 ENCOUNTER — TELEPHONE (OUTPATIENT)
Dept: GASTROENTEROLOGY | Facility: CLINIC | Age: 74
End: 2024-02-16
Payer: MEDICARE

## 2024-02-16 DIAGNOSIS — Z78.0 POST-MENOPAUSAL: ICD-10-CM

## 2024-02-16 NOTE — TELEPHONE ENCOUNTER
----- Message from Mervat Pritchard sent at 2/16/2024  9:54 AM CST -----  Contact: self  Type:  Patient Returning Call    Who Called:Brigitte Bell  Who Left Message for Patient:unsure  Does the patient know what this is regarding?:appt reschedule  Would the patient rather a call back or a response via [facility]?   Best Call Back Number:[phone]  Additional Information: pt needs appt to be later

## 2024-02-20 RX ORDER — ESTRADIOL 0.1 MG/G
CREAM VAGINAL
Qty: 42.5 G | Refills: 3 | Status: SHIPPED | OUTPATIENT
Start: 2024-02-20

## 2024-02-26 ENCOUNTER — OFFICE VISIT (OUTPATIENT)
Dept: GASTROENTEROLOGY | Facility: CLINIC | Age: 74
End: 2024-02-26
Payer: MEDICARE

## 2024-02-26 VITALS
WEIGHT: 163 LBS | HEART RATE: 73 BPM | OXYGEN SATURATION: 98 % | BODY MASS INDEX: 28.88 KG/M2 | DIASTOLIC BLOOD PRESSURE: 81 MMHG | SYSTOLIC BLOOD PRESSURE: 154 MMHG | HEIGHT: 63 IN

## 2024-02-26 DIAGNOSIS — K21.9 GASTROESOPHAGEAL REFLUX DISEASE, UNSPECIFIED WHETHER ESOPHAGITIS PRESENT: ICD-10-CM

## 2024-02-26 DIAGNOSIS — K31.A0 GASTRIC INTESTINAL METAPLASIA: ICD-10-CM

## 2024-02-26 DIAGNOSIS — K22.70 BARRETT'S ESOPHAGUS WITHOUT DYSPLASIA: ICD-10-CM

## 2024-02-26 DIAGNOSIS — K58.0 IRRITABLE BOWEL SYNDROME WITH DIARRHEA: Primary | ICD-10-CM

## 2024-02-26 PROCEDURE — 99213 OFFICE O/P EST LOW 20 MIN: CPT | Mod: S$GLB,,, | Performed by: NURSE PRACTITIONER

## 2024-02-26 RX ORDER — HYOSCYAMINE SULFATE 0.12 MG/1
0.12 TABLET SUBLINGUAL EVERY 6 HOURS PRN
Qty: 90 TABLET | Refills: 1 | Status: SHIPPED | OUTPATIENT
Start: 2024-02-26

## 2024-02-26 NOTE — PATIENT INSTRUCTIONS
Levsin Refilled.  Continue with Zegrid as needed.    Please notify my office if you have not been contacted within two weeks after any procedures, submitting any samples (biopsies, blood, stool, urine, etc.) or after any imaging (X-ray, CT, MRI, etc.).

## 2024-02-26 NOTE — PROGRESS NOTES
Clinic Note    Reason for visit:  The primary encounter diagnosis was Irritable bowel syndrome with diarrhea. Diagnoses of Florentino's esophagus without dysplasia, Gastric intestinal metaplasia, and Gastroesophageal reflux disease, unspecified whether esophagitis present were also pertinent to this visit.    PCP: Lotus Berger       HPI:  This is a 73 y.o. female who is here for a follow up. Patient with h/o IBS-D, BE, and GIM. She takes Zegerid as needed. Denies any heartburn/reflux. Takes imodium as needed for IBS-D which helps symptoms. Denies any more episodes of fecal incontinence. She does have intermittent cramping pain. Takes levsin rarely which helps. She was given course of xifaxan 7/2023 but does not feel like that helped her symptoms.       EGD 9/28/2023: FBx focal mini act mild chr gitis, gastric mapping otherwise: mild chr inact gitis w/o Hp. Distal EBx reflux w/o BE. Repeat EGD in 3 years.     7/2023: C diff/Giardia/crypto neg, elastase wnl  RUQ US 4/2022 wnl    EGD 4/2022 with Dr. Keith: small HH, GBx chronic inact gastr w/IM. GEBx chronic carditis, EBx@38 chronic esophagitis/carditis, EBx@37 Columnar mucosa.      EGD w/Dr. Keith 6/2020: DBx peptic duodenitis, GBx mild chronic gastr w/IM. EBx reflux/BE.  EGD/Colonoscopy w/Dr. Keith 9/20/2018: Severe diverticulosis of sigmoid and descending, gr I IH. TIBx nl, CBx wnl, GBx mild  davin w/o Hp, EBx reflux with gastric cardiac mucosa.  Repeat colon in 10 yr.    Review of Systems   Constitutional:  Negative for fatigue, fever and unexpected weight change.   HENT:  Negative for mouth sores, postnasal drip, sore throat and trouble swallowing.    Eyes:  Negative for pain, discharge and eye dryness.   Respiratory:  Negative for apnea, cough, choking, chest tightness, shortness of breath and wheezing.    Cardiovascular:  Negative for chest pain, palpitations and leg swelling.   Gastrointestinal:  Negative for abdominal distention, abdominal pain, anal  bleeding, blood in stool, change in bowel habit, constipation, diarrhea, nausea, rectal pain, vomiting, reflux and fecal incontinence.   Genitourinary:  Negative for bladder incontinence, dysuria and hematuria.   Musculoskeletal:  Negative for arthralgias, back pain and joint swelling.   Integumentary:  Negative for color change and rash.   Allergic/Immunologic: Negative for environmental allergies and food allergies.   Neurological:  Negative for seizures and headaches.   Hematological:  Negative for adenopathy. Does not bruise/bleed easily.        Past Medical History:   Diagnosis Date    Cystitis     Diaphragmatic hernia     Diarrhea     GERD (gastroesophageal reflux disease)     Hypertension     IBS (irritable bowel syndrome)     Insomnia     Stomach cramps     Thyroid disease      Past Surgical History:   Procedure Laterality Date    CHOLECYSTECTOMY      COLONOSCOPY      2020    ESOPHAGOGASTRODUODENOSCOPY  04/20/2022    GASTROSCOPY       Family History   Problem Relation Age of Onset    Diabetes Mother     Hypertension Mother     Hypertension Father     Colon cancer Neg Hx     Crohn's disease Neg Hx     Esophageal cancer Neg Hx     Liver cancer Neg Hx     Liver disease Neg Hx     Rectal cancer Neg Hx     Stomach cancer Neg Hx      Social History     Tobacco Use    Smoking status: Never    Smokeless tobacco: Never   Substance Use Topics    Alcohol use: Not Currently    Drug use: Not Currently     Review of patient's allergies indicates:  No Known Allergies   Medication List with Changes/Refills   Current Medications    AMITRIPTYLINE (ELAVIL) 25 MG TABLET    TAKE ONE TABLET BY MOUTH EVERY EVENING    CARVEDILOL (COREG) 12.5 MG TABLET        DICLOFENAC SODIUM (VOLTAREN) 1 % GEL    Apply 2 g topically as needed.    ESTRADIOL (ESTRACE) 0.01 % (0.1 MG/GRAM) VAGINAL CREAM    INSERT 1 GRAM VAGINALLY EVERY NIGHT AT BEDTIME    FLECAINIDE (TAMBOCOR) 50 MG TAB    100 mg in the AM and 50 mg at hs    LEVOTHYROXINE (SYNTHROID)  "100 MCG TABLET    Take 1 tablet (100 mcg total) by mouth before breakfast.    MEDROXYPROGESTERONE (PROVERA) 5 MG TABLET    Take 1 tablet (5 mg total) by mouth once daily.    METH/MEBLUE/SOD PHOS/PSAL/HYOS (URIBEL ORAL)    Take by mouth.   Changed and/or Refilled Medications    Modified Medication Previous Medication    HYOSCYAMINE (LEVSIN/SL) 0.125 MG SUBL hyoscyamine (LEVSIN/SL) 0.125 mg Subl       Take 1 tablet (0.125 mg total) by mouth every 6 (six) hours as needed (abdominal pain).       Discontinued Medications    AMLODIPINE (NORVASC) 2.5 MG TABLET    Take 2.5 mg by mouth 2 (two) times daily.    MINOXIDIL (ROGAINE) 2 % EXTERNAL SOLUTION    Apply topically 2 (two) times daily. Can get otc, apply very small film to avoid low BP    OMEPRAZOLE-SODIUM BICARBONATE (ZEGERID) 40-1.1 MG-GRAM PER CAPSULE             Vital Signs:  BP (!) 154/81 (BP Location: Left arm, Patient Position: Sitting)   Pulse 73   Ht 5' 3" (1.6 m)   Wt 73.9 kg (163 lb)   SpO2 98%   BMI 28.87 kg/m²        Physical Exam  Vitals reviewed.   Constitutional:       General: She is awake. She is not in acute distress.     Appearance: Normal appearance. She is well-developed. She is not ill-appearing, toxic-appearing or diaphoretic.   HENT:      Head: Normocephalic and atraumatic.      Nose: Nose normal.      Mouth/Throat:      Mouth: Mucous membranes are moist.      Pharynx: Oropharynx is clear. No oropharyngeal exudate or posterior oropharyngeal erythema.   Eyes:      General: Lids are normal. Gaze aligned appropriately. No scleral icterus.        Right eye: No discharge.         Left eye: No discharge.      Conjunctiva/sclera: Conjunctivae normal.   Neck:      Trachea: Trachea normal.   Cardiovascular:      Rate and Rhythm: Normal rate and regular rhythm.      Pulses:           Radial pulses are 2+ on the right side and 2+ on the left side.   Pulmonary:      Effort: Pulmonary effort is normal. No respiratory distress.      Breath sounds: No " stridor. No wheezing.   Chest:      Chest wall: No tenderness.   Abdominal:      General: Bowel sounds are normal. There is no distension.      Palpations: Abdomen is soft. There is no fluid wave, hepatomegaly or mass.      Tenderness: There is no abdominal tenderness. There is no guarding or rebound.   Musculoskeletal:         General: No tenderness or deformity.      Cervical back: Full passive range of motion without pain and neck supple. No tenderness.      Right lower leg: No edema.      Left lower leg: No edema.   Lymphadenopathy:      Cervical: No cervical adenopathy.   Skin:     General: Skin is warm and dry.      Capillary Refill: Capillary refill takes less than 2 seconds.      Coloration: Skin is not cyanotic, jaundiced or pale.   Neurological:      General: No focal deficit present.      Mental Status: She is alert and oriented to person, place, and time.      Motor: No tremor.   Psychiatric:         Attention and Perception: Attention normal.         Mood and Affect: Mood and affect normal.         Speech: Speech normal.         Behavior: Behavior normal. Behavior is cooperative.            All of the data above and below has been reviewed by myself and any further interpretations will be reflected in the assessment and plan.   The data includes review of external notes, and independent interpretation of lab results, procedures, x-rays, and imaging reports.      Assessment:  Irritable bowel syndrome with diarrhea  -     hyoscyamine (LEVSIN/SL) 0.125 mg Subl; Take 1 tablet (0.125 mg total) by mouth every 6 (six) hours as needed (abdominal pain).  Dispense: 90 tablet; Refill: 1    Florentino's esophagus without dysplasia    Gastric intestinal metaplasia  Comments:  gastric mapping 9/2023 neg.    Gastroesophageal reflux disease, unspecified whether esophagitis present         Barretts Esophagus- Recent EGD neg. For IM- repeat in 3 years. EGD due 9/2026  IBS-D- controlled with Imodium as needed. Will refill  Levsin. Failed xifaxan course.  Screening colonoscopy due 2028    Recommendations:    Levsin Refilled.  Continue with Zegrid as needed.      Risks, benefits, and alternatives of medical management, any associated procedures, and/or treatment discussed with the patient. Patient given opportunity to ask questions and voices understanding. Patient has elected to proceed with the recommended care modalities as discussed.    Follow up if symptoms worsen or fail to improve.    Order summary:  No orders of the defined types were placed in this encounter.       Instructed patient to notify my office if they have not been contacted within two weeks after any procedures, submitting any samples (biopsies, blood, stool, urine, etc.) or after any imaging (X-ray, CT, MRI, etc.).      Gala Bailey NP    This document may have been created using a voice recognition transcribing system. Incorrect words or phrases may have been missed during proofreading. Please interpret accordingly or contact me for clarification.

## 2024-03-01 ENCOUNTER — TELEPHONE (OUTPATIENT)
Dept: GASTROENTEROLOGY | Facility: CLINIC | Age: 74
End: 2024-03-01
Payer: MEDICARE

## 2024-03-01 NOTE — TELEPHONE ENCOUNTER
Jack from [facility] called stated she have faxed over a form but haven't gotten a response to change the code so the insurance will cover this pt lab.  the Giardia/Cryptosporidum, EIA lab test is the Enteric Parasite Panel and the insurance will not cover the code  IBS-D K58.0..   the insurance  WILL cover the Diarrhea code  R19.7..     can this code be used?  She will refax the form to be signed.

## 2024-04-19 NOTE — TELEPHONE ENCOUNTER
Patient requested to come in due to have reoccurring diarrhea. OV scheduled w/ KN on 5/7/24 at 9 am. DMP

## 2024-05-06 NOTE — PROGRESS NOTES
Clinic Note    Reason for visit:  The primary encounter diagnosis was Irritable bowel syndrome with diarrhea. Diagnoses of Florentino's esophagus without dysplasia, Gastroesophageal reflux disease, unspecified whether esophagitis present, and Diarrhea, unspecified type were also pertinent to this visit.    PCP: Lotus Berger       HPI:  This is a 73 y.o. female here for follow up. Patient with h/o IBS, BE, and GIM. Takes imodium as needed. She states she began having episodes of diarrhea again even with the imodium. She has been on cholestyramine in the past which did help but caused constipation. She does notice certain foods that seem to exacerbate symptoms. She denies rectal bleeding. No reflux, abdominal pain.    Given Xifaxan 7/2023 with no improvement of symptoms.     EGD 9/28/2023: FBx focal mini act mild chr gitis, gastric mapping otherwise: mild chr inact gitis w/o Hp. Distal EBx reflux w/o BE. Repeat EGD in 3 years.      7/2023: C diff/Giardia/crypto neg, elastase wnl  RUQ US 4/2022 wnl     EGD 4/2022 with Dr. Keith: small HH, GBx chronic inact gastr w/IM. GEBx chronic carditis, EBx@38 chronic esophagitis/carditis, EBx@37 Columnar mucosa.       EGD w/Dr. Keith 6/2020: DBx peptic duodenitis, GBx mild chronic gastr w/IM. EBx reflux/BE.  EGD/Colonoscopy w/Dr. Keith 9/20/2018: Severe diverticulosis of sigmoid and descending, gr I IH. TIBx nl, CBx wnl, GBx mild  davin w/o Hp, EBx reflux with gastric cardiac mucosa.  Repeat colon in 10 yr.    Review of Systems   Constitutional:  Positive for fatigue. Negative for fever and unexpected weight change.   HENT:  Negative for mouth sores, postnasal drip, sore throat and trouble swallowing.    Eyes:  Negative for pain, discharge and eye dryness.   Respiratory:  Negative for apnea, cough, choking, chest tightness, shortness of breath and wheezing.    Cardiovascular:  Positive for palpitations. Negative for chest pain and leg swelling.   Gastrointestinal:  Negative  for abdominal distention, abdominal pain, anal bleeding, blood in stool, change in bowel habit, constipation, diarrhea, nausea, rectal pain, vomiting, reflux and fecal incontinence.   Genitourinary:  Positive for dysuria. Negative for bladder incontinence and hematuria.   Musculoskeletal:  Negative for arthralgias, back pain and joint swelling.   Integumentary:  Negative for color change and rash.   Allergic/Immunologic: Negative for environmental allergies and food allergies.   Neurological:  Negative for seizures and headaches.   Hematological:  Negative for adenopathy. Bruises/bleeds easily.        Past Medical History:   Diagnosis Date    Cystitis     Diaphragmatic hernia     Diarrhea     GERD (gastroesophageal reflux disease)     Hypertension     IBS (irritable bowel syndrome)     Insomnia     Stomach cramps     Thyroid disease      Past Surgical History:   Procedure Laterality Date    CHOLECYSTECTOMY      COLONOSCOPY      2020    ESOPHAGOGASTRODUODENOSCOPY  04/20/2022    GASTROSCOPY       Family History   Problem Relation Name Age of Onset    Diabetes Mother      Hypertension Mother      Hypertension Father      Colon cancer Neg Hx      Crohn's disease Neg Hx      Esophageal cancer Neg Hx      Liver cancer Neg Hx      Liver disease Neg Hx      Rectal cancer Neg Hx      Stomach cancer Neg Hx       Social History     Tobacco Use    Smoking status: Never    Smokeless tobacco: Never   Substance Use Topics    Alcohol use: Not Currently    Drug use: Not Currently     Review of patient's allergies indicates:  No Known Allergies   Medication List with Changes/Refills   New Medications    CHOLESTYRAMINE (QUESTRAN) 4 GRAM PACKET    Take 1 packet (4 g total) by mouth 2 (two) times daily. Avoid taking other medication 2 hours before and 4 hours after taking this medicine.   Current Medications    AMITRIPTYLINE (ELAVIL) 25 MG TABLET    TAKE ONE TABLET BY MOUTH EVERY EVENING    CARVEDILOL (COREG) 12.5 MG TABLET         "DICLOFENAC SODIUM (VOLTAREN) 1 % GEL    Apply 2 g topically as needed.    ESTRADIOL (ESTRACE) 0.01 % (0.1 MG/GRAM) VAGINAL CREAM    INSERT 1 GRAM VAGINALLY EVERY NIGHT AT BEDTIME    FLECAINIDE (TAMBOCOR) 100 MG TAB    Take 100 mg by mouth 3 (three) times a week. Monday, Wednesday, Friday    FLECAINIDE (TAMBOCOR) 50 MG TAB    Take 50 mg by mouth every evening. 100mg take M-W-F    HYOSCYAMINE (LEVSIN/SL) 0.125 MG SUBL    Take 1 tablet (0.125 mg total) by mouth every 6 (six) hours as needed (abdominal pain).    IRBESARTAN (AVAPRO) 150 MG TABLET    Take 150 mg by mouth once daily.    LEVOTHYROXINE (SYNTHROID) 100 MCG TABLET    Take 1 tablet (100 mcg total) by mouth before breakfast.    MEDROXYPROGESTERONE (PROVERA) 5 MG TABLET    Take 1 tablet (5 mg total) by mouth once daily.    METH/MEBLUE/SOD PHOS/PSAL/HYOS (URIBEL ORAL)    Take by mouth.    SULFAMETHOXAZOLE-TRIMETHOPRIM 800-160MG (BACTRIM DS) 800-160 MG TAB    Take 1 tablet by mouth 2 (two) times daily.         Vital Signs:  /78 (BP Location: Left arm, Patient Position: Sitting, BP Method: Medium (Automatic))   Pulse 60   Resp 16   Ht 5' 3" (1.6 m)   Wt 71 kg (156 lb 9.6 oz)   SpO2 97%   BMI 27.74 kg/m²        Physical Exam  Vitals reviewed.   Constitutional:       General: She is awake. She is not in acute distress.     Appearance: Normal appearance. She is well-developed. She is not ill-appearing, toxic-appearing or diaphoretic.   HENT:      Head: Normocephalic and atraumatic.      Nose: Nose normal.      Mouth/Throat:      Mouth: Mucous membranes are moist.      Pharynx: Oropharynx is clear. No oropharyngeal exudate or posterior oropharyngeal erythema.   Eyes:      General: Lids are normal. Gaze aligned appropriately. No scleral icterus.        Right eye: No discharge.         Left eye: No discharge.      Conjunctiva/sclera: Conjunctivae normal.   Neck:      Trachea: Trachea normal.   Cardiovascular:      Rate and Rhythm: Normal rate and regular " rhythm.      Pulses:           Radial pulses are 2+ on the right side and 2+ on the left side.   Pulmonary:      Effort: Pulmonary effort is normal. No respiratory distress.      Breath sounds: No stridor. No wheezing.   Chest:      Chest wall: No tenderness.   Abdominal:      General: Bowel sounds are normal. There is no distension.      Palpations: Abdomen is soft. There is no fluid wave, hepatomegaly or mass.      Tenderness: There is no abdominal tenderness. There is no guarding or rebound.   Musculoskeletal:         General: No tenderness or deformity.      Cervical back: Full passive range of motion without pain and neck supple. No tenderness.      Right lower leg: No edema.      Left lower leg: No edema.   Lymphadenopathy:      Cervical: No cervical adenopathy.   Skin:     General: Skin is warm and dry.      Capillary Refill: Capillary refill takes less than 2 seconds.      Coloration: Skin is not cyanotic, jaundiced or pale.   Neurological:      General: No focal deficit present.      Mental Status: She is alert and oriented to person, place, and time.      Motor: No tremor.   Psychiatric:         Attention and Perception: Attention normal.         Mood and Affect: Mood and affect normal.         Speech: Speech normal.         Behavior: Behavior normal. Behavior is cooperative.            All of the data above and below has been reviewed by myself and any further interpretations will be reflected in the assessment and plan.   The data includes review of external notes, and independent interpretation of lab results, procedures, x-rays, and imaging reports.      Assessment:  Irritable bowel syndrome with diarrhea    Florentino's esophagus without dysplasia    Gastroesophageal reflux disease, unspecified whether esophagitis present    Diarrhea, unspecified type  -     cholestyramine (QUESTRAN) 4 gram packet; Take 1 packet (4 g total) by mouth 2 (two) times daily. Avoid taking other medication 2 hours before and 4  hours after taking this medicine.  Dispense: 180 packet; Refill: 1        Barretts Esophagus- Recent EGD neg. For IM- repeat in 3 years. EGD due 9/2026  IBS-D- controlled with Imodium as needed. Will refill Levsin. Failed xifaxan course. Trial of Cholestyramine. Discussed to take 1/2 packet.  Screening colonoscopy due 2028     Recommendations:    Start Cholestyramine 1/2 packet daily. May take every other day if having constipation.      Risks, benefits, and alternatives of medical management, any associated procedures, and/or treatment discussed with the patient. Patient given opportunity to ask questions and voices understanding. Patient has elected to proceed with the recommended care modalities as discussed.    Follow up in about 1 year (around 5/7/2025).    Order summary:  No orders of the defined types were placed in this encounter.       Instructed patient to notify my office if they have not been contacted within two weeks after any procedures, submitting any samples (biopsies, blood, stool, urine, etc.) or after any imaging (X-ray, CT, MRI, etc.).      Gala Bailey NP    This document may have been created using a voice recognition transcribing system. Incorrect words or phrases may have been missed during proofreading. Please interpret accordingly or contact me for clarification.

## 2024-05-07 ENCOUNTER — OFFICE VISIT (OUTPATIENT)
Dept: GASTROENTEROLOGY | Facility: CLINIC | Age: 74
End: 2024-05-07
Payer: MEDICARE

## 2024-05-07 VITALS
DIASTOLIC BLOOD PRESSURE: 78 MMHG | SYSTOLIC BLOOD PRESSURE: 127 MMHG | RESPIRATION RATE: 16 BRPM | HEIGHT: 63 IN | BODY MASS INDEX: 27.75 KG/M2 | WEIGHT: 156.63 LBS | OXYGEN SATURATION: 97 % | HEART RATE: 60 BPM

## 2024-05-07 DIAGNOSIS — R19.7 DIARRHEA, UNSPECIFIED TYPE: ICD-10-CM

## 2024-05-07 DIAGNOSIS — K22.70 BARRETT'S ESOPHAGUS WITHOUT DYSPLASIA: ICD-10-CM

## 2024-05-07 DIAGNOSIS — K58.0 IRRITABLE BOWEL SYNDROME WITH DIARRHEA: Primary | ICD-10-CM

## 2024-05-07 DIAGNOSIS — K21.9 GASTROESOPHAGEAL REFLUX DISEASE, UNSPECIFIED WHETHER ESOPHAGITIS PRESENT: ICD-10-CM

## 2024-05-07 PROCEDURE — 99214 OFFICE O/P EST MOD 30 MIN: CPT | Mod: S$GLB,,, | Performed by: NURSE PRACTITIONER

## 2024-05-07 RX ORDER — SULFAMETHOXAZOLE AND TRIMETHOPRIM 800; 160 MG/1; MG/1
1 TABLET ORAL 2 TIMES DAILY
COMMUNITY
Start: 2024-05-06

## 2024-05-07 RX ORDER — IRBESARTAN 150 MG/1
150 TABLET ORAL DAILY
COMMUNITY
Start: 2024-03-12

## 2024-05-07 RX ORDER — CHOLESTYRAMINE 4 G/9G
4 POWDER, FOR SUSPENSION ORAL 2 TIMES DAILY
Qty: 180 PACKET | Refills: 1 | Status: SHIPPED | OUTPATIENT
Start: 2024-05-07 | End: 2024-11-03

## 2024-05-07 RX ORDER — FLECAINIDE ACETATE 100 MG/1
100 TABLET ORAL
COMMUNITY
Start: 2024-04-03

## 2024-05-07 NOTE — PATIENT INSTRUCTIONS
Start Cholestyramine 1/2 packet daily. May take every other day if having constipation.    Please notify my office if you have not been contacted within two weeks after any procedures, submitting any samples (biopsies, blood, stool, urine, etc.) or after any imaging (X-ray, CT, MRI, etc.).

## 2024-05-22 ENCOUNTER — TELEPHONE (OUTPATIENT)
Dept: PRIMARY CARE CLINIC | Facility: CLINIC | Age: 74
End: 2024-05-22
Payer: MEDICARE

## 2024-05-22 VITALS — DIASTOLIC BLOOD PRESSURE: 88 MMHG | SYSTOLIC BLOOD PRESSURE: 128 MMHG

## 2024-07-05 DIAGNOSIS — K58.0 IRRITABLE BOWEL SYNDROME WITH DIARRHEA: ICD-10-CM

## 2024-07-08 RX ORDER — HYOSCYAMINE SULFATE 0.12 MG/1
TABLET SUBLINGUAL
Qty: 90 TABLET | Refills: 1 | Status: SHIPPED | OUTPATIENT
Start: 2024-07-08

## 2024-07-16 ENCOUNTER — CLINICAL SUPPORT (OUTPATIENT)
Dept: OBSTETRICS AND GYNECOLOGY | Facility: CLINIC | Age: 74
End: 2024-07-16
Payer: MEDICARE

## 2024-07-16 DIAGNOSIS — E03.9 ACQUIRED HYPOTHYROIDISM: ICD-10-CM

## 2024-07-16 DIAGNOSIS — Z01.89 ROUTINE LAB DRAW: Primary | ICD-10-CM

## 2024-07-16 DIAGNOSIS — E78.5 HYPERLIPIDEMIA, UNSPECIFIED HYPERLIPIDEMIA TYPE: ICD-10-CM

## 2024-07-16 DIAGNOSIS — I10 HYPERTENSION, UNSPECIFIED TYPE: Primary | ICD-10-CM

## 2024-07-16 LAB
ABS NRBC COUNT: 0 THOU/UL (ref 0–0.01)
ABSOLUTE BASOPHIL: 0 10*3/UL (ref 0–0.3)
ABSOLUTE EOSINOPHIL: 0.1 10*3/UL (ref 0–0.6)
ABSOLUTE IMMATURE GRAN: 0.02 THOU/UL (ref 0–0.03)
ABSOLUTE LYMPHOCYTE: 2 10*3/UL (ref 1.2–4)
ABSOLUTE MONOCYTE: 0.4 10*3/UL (ref 0.1–0.8)
ALBUMIN SERPL BCP-MCNC: 3.7 G/DL (ref 3.4–5)
ALP SERPL-CCNC: 54 U/L (ref 45–117)
ALT SERPL W P-5'-P-CCNC: 23 U/L (ref 13–56)
ANION GAP SERPL CALC-SCNC: 5 MMOL/L (ref 3–11)
APPEARANCE, UA: CLEAR
AST SERPL-CCNC: 19 U/L (ref 15–37)
BASOPHILS NFR BLD: 0.5 % (ref 0–3)
BILIRUB SERPL-MCNC: 1.2 MG/DL (ref 0.2–1)
BILIRUB UR QL STRIP: NEGATIVE
BUN SERPL-MCNC: 12 MG/DL (ref 7–18)
BUN/CREAT SERPL: 16 RATIO
CALCIUM SERPL-MCNC: 8.8 MG/DL (ref 8.5–10.1)
CHLORIDE SERPL-SCNC: 100 MMOL/L (ref 98–107)
CHOLEST SERPL-MSCNC: 202 MG/DL
CO2 SERPL-SCNC: 27 MMOL/L (ref 21–32)
COLOR UR: YELLOW
CREAT SERPL-MCNC: 0.75 MG/DL (ref 0.55–1.02)
EOSINOPHIL NFR BLD: 1.8 % (ref 0–6)
ERYTHROCYTE [DISTWIDTH] IN BLOOD BY AUTOMATED COUNT: 13.7 % (ref 0–15.5)
GFR ESTIMATION: > 60
GLUCOSE (UA): NEGATIVE MG/DL
GLUCOSE SERPL-MCNC: 100 MG/DL (ref 74–106)
HCT VFR BLD AUTO: 41.6 % (ref 37–47)
HDLC SERPL-MCNC: 80 MG/DL
HGB BLD-MCNC: 13.8 G/DL (ref 12–16)
HGB UR QL STRIP: NEGATIVE
IMMATURE GRANULOCYTES: 0.4 % (ref 0–0.43)
KETONES UR QL STRIP: NEGATIVE MG/DL
LDLC SERPL CALC-MCNC: 106.8 MG/DL
LEUKOCYTE ESTERASE UR QL STRIP: NEGATIVE LEU/UL
LYMPHOCYTES NFR BLD: 36.7 % (ref 20–45)
MCH RBC QN AUTO: 29.3 PG (ref 27–32)
MCHC RBC AUTO-ENTMCNC: 33.2 % (ref 32–36)
MCV RBC AUTO: 88.3 FL (ref 80–99)
MONOCYTES NFR BLD: 8 % (ref 2–10)
NEUTROPHILS # BLD AUTO: 2.9 10*3/UL (ref 1.4–7)
NEUTROPHILS NFR BLD: 52.6 % (ref 50–80)
NITRITE UR QL STRIP: NEGATIVE
NUCLEATED RED BLOOD CELLS: 0 % (ref 0–0.2)
PH UR STRIP: 7 PH (ref 5–8)
PLATELETS: 231 10*3/UL (ref 130–400)
PMV BLD AUTO: 10.9 FL (ref 9.2–12.2)
POTASSIUM SERPL-SCNC: 4.7 MMOL/L (ref 3.5–5.1)
PROT SERPL-MCNC: 6.7 G/DL (ref 6.4–8.2)
PROT UR QL STRIP: NEGATIVE MG/DL
RBC # BLD AUTO: 4.71 10*6/UL (ref 4.2–5.4)
SODIUM BLD-SCNC: 132 MMOL/L (ref 131–143)
SP GR UR STRIP: 1.01 (ref 1–1.03)
T4 FREE SP9 P CHAL SERPL-SCNC: 1.24 NG/DL (ref 0.76–1.46)
TRIGL SERPL-MCNC: 76 MG/DL (ref 0–149)
TSH SERPL DL<=0.005 MIU/L-ACNC: 1.87 UIU/ML (ref 0.36–3.74)
UROBILINOGEN UR STRIP-ACNC: NORMAL MG/DL
VLDL CHOLESTEROL: 15 MG/DL
WBC # BLD: 5.5 10*3/UL (ref 4.5–10)

## 2024-07-16 PROCEDURE — 36415 COLL VENOUS BLD VENIPUNCTURE: CPT | Mod: S$GLB,,, | Performed by: INTERNAL MEDICINE

## 2024-07-17 ENCOUNTER — PATIENT MESSAGE (OUTPATIENT)
Dept: PRIMARY CARE CLINIC | Facility: CLINIC | Age: 74
End: 2024-07-17
Payer: MEDICARE

## 2024-07-25 ENCOUNTER — OFFICE VISIT (OUTPATIENT)
Dept: PRIMARY CARE CLINIC | Facility: CLINIC | Age: 74
End: 2024-07-25
Payer: MEDICARE

## 2024-07-25 VITALS
OXYGEN SATURATION: 99 % | HEIGHT: 63 IN | BODY MASS INDEX: 28.03 KG/M2 | HEART RATE: 64 BPM | WEIGHT: 158.19 LBS | DIASTOLIC BLOOD PRESSURE: 70 MMHG | SYSTOLIC BLOOD PRESSURE: 112 MMHG

## 2024-07-25 DIAGNOSIS — E03.9 ACQUIRED HYPOTHYROIDISM: ICD-10-CM

## 2024-07-25 DIAGNOSIS — E78.5 HYPERLIPIDEMIA, UNSPECIFIED HYPERLIPIDEMIA TYPE: ICD-10-CM

## 2024-07-25 DIAGNOSIS — I10 HYPERTENSION, UNSPECIFIED TYPE: ICD-10-CM

## 2024-07-25 DIAGNOSIS — K58.0 IRRITABLE BOWEL SYNDROME WITH DIARRHEA: Primary | ICD-10-CM

## 2024-07-25 PROCEDURE — 99213 OFFICE O/P EST LOW 20 MIN: CPT | Mod: S$GLB,,, | Performed by: INTERNAL MEDICINE

## 2024-07-25 NOTE — PROGRESS NOTES
Subjective:      Patient ID: Brigitte Bell is a 73 y.o. female.    Chief Complaint: Follow-up    HPI    Past Medical History:   Diagnosis Date    Cystitis     Diaphragmatic hernia     Diarrhea     GERD (gastroesophageal reflux disease)     Hypertension     IBS (irritable bowel syndrome)     Insomnia     Stomach cramps     Thyroid disease      Patient here for follow up  Dr Jin at [facility] in [place] is her Cardiologist and increased her flecainide and BP medication  States her diarrhea has improved. Established with GI for IBS  No acute complaints today         Review of Systems   Constitutional:  Negative for chills and fever.   HENT:  Negative for hearing loss.    Eyes:  Negative for blurred vision.   Respiratory:  Negative for cough, shortness of breath and wheezing.    Cardiovascular:  Negative for chest pain, palpitations and leg swelling.   Gastrointestinal:  Negative for abdominal pain, blood in stool, constipation, diarrhea, melena, nausea and vomiting.   Genitourinary:  Negative for dysuria, frequency and urgency.   Musculoskeletal:  Negative for falls.   Skin:  Negative for rash.   Neurological:  Negative for dizziness and headaches.   Endo/Heme/Allergies:  Does not bruise/bleed easily.   Psychiatric/Behavioral:  Negative for depression. The patient is not nervous/anxious.      Objective:     Physical Exam  Vitals reviewed.   Constitutional:       Appearance: Normal appearance.   HENT:      Head: Normocephalic.      Mouth/Throat:      Mouth: Mucous membranes are moist.      Pharynx: Oropharynx is clear.   Eyes:      Extraocular Movements: Extraocular movements intact.      Conjunctiva/sclera: Conjunctivae normal.      Pupils: Pupils are equal, round, and reactive to light.   Cardiovascular:      Rate and Rhythm: Normal rate and regular rhythm.   Pulmonary:      Effort: Pulmonary effort is normal.      Breath sounds: Normal breath sounds.   Abdominal:      General: Bowel sounds are normal.  "  Musculoskeletal:      Right lower leg: No edema.      Left lower leg: No edema.   Skin:     General: Skin is warm.      Capillary Refill: Capillary refill takes less than 2 seconds.   Neurological:      Mental Status: She is alert and oriented to person, place, and time.   Psychiatric:         Mood and Affect: Mood normal.       /70 (BP Location: Left arm, Patient Position: Sitting, BP Method: Medium (Automatic))   Pulse 64   Ht 5' 3" (1.6 m)   Wt 71.8 kg (158 lb 3.2 oz)   SpO2 99%   BMI 28.02 kg/m²     Assessment:       ICD-10-CM ICD-9-CM   1. Irritable bowel syndrome with diarrhea  K58.0 564.1   2. Acquired hypothyroidism  E03.9 244.9   3. Hyperlipidemia, unspecified hyperlipidemia type  E78.5 272.4   4. Hypertension, unspecified type  I10 401.9       Plan:     Medication List with Changes/Refills   Current Medications    AMITRIPTYLINE (ELAVIL) 25 MG TABLET    TAKE ONE TABLET BY MOUTH EVERY EVENING    CARVEDILOL (COREG) 12.5 MG TABLET        CHOLESTYRAMINE (QUESTRAN) 4 GRAM PACKET    Take 1 packet (4 g total) by mouth 2 (two) times daily. Avoid taking other medication 2 hours before and 4 hours after taking this medicine.    DICLOFENAC SODIUM (VOLTAREN) 1 % GEL    Apply 2 g topically as needed.    ESTRADIOL (ESTRACE) 0.01 % (0.1 MG/GRAM) VAGINAL CREAM    INSERT 1 GRAM VAGINALLY EVERY NIGHT AT BEDTIME    FLECAINIDE (TAMBOCOR) 100 MG TAB    Take 100 mg by mouth 3 (three) times a week. Monday, Wednesday, Friday    FLECAINIDE (TAMBOCOR) 50 MG TAB    Take 50 mg by mouth every evening. 100mg take M-W-F    HYOSCYAMINE 0.125 MG SUBL    DISSOLVE ONE TABLET UNDER THE TONGUE EVERY 6 HOURS AS NEEDED FOR ABDOMINAL PAIN    IRBESARTAN (AVAPRO) 150 MG TABLET    Take 150 mg by mouth once daily.    LEVOTHYROXINE (SYNTHROID) 100 MCG TABLET    Take 1 tablet (100 mcg total) by mouth before breakfast.    METH/MEBLUE/SOD PHOS/PSAL/HYOS (URIBEL ORAL)    Take by mouth.    SULFAMETHOXAZOLE-TRIMETHOPRIM 800-160MG (BACTRIM DS) " 800-160 MG TAB    Take 1 tablet by mouth 2 (two) times daily.        1. Irritable bowel syndrome with diarrhea    2. Acquired hypothyroidism    3. Hyperlipidemia, unspecified hyperlipidemia type    4. Hypertension, unspecified type         Labs reviewed and cholesterol still high, she states she will take her cholestyramine more often and does not wish to be on a statin. Recheck levels at the next visit. RTC sooner if needed     Continue current medications        Future Appointments   Date Time Provider Department Center   5/7/2025  1:00 PM Gala Bailey FNP [facility]  [address]

## 2024-08-27 RX ORDER — AMITRIPTYLINE HYDROCHLORIDE 25 MG/1
25 TABLET, FILM COATED ORAL NIGHTLY
Qty: 90 TABLET | Refills: 3 | Status: SHIPPED | OUTPATIENT
Start: 2024-08-27

## 2024-09-09 DIAGNOSIS — Z78.0 POST-MENOPAUSAL: ICD-10-CM

## 2024-09-11 RX ORDER — ESTRADIOL 0.1 MG/G
CREAM VAGINAL
Qty: 42.5 G | Refills: 3 | Status: SHIPPED | OUTPATIENT
Start: 2024-09-11

## 2024-09-24 ENCOUNTER — PATIENT MESSAGE (OUTPATIENT)
Dept: PRIMARY CARE CLINIC | Facility: CLINIC | Age: 74
End: 2024-09-24
Payer: MEDICARE

## 2025-01-03 DIAGNOSIS — Z78.0 POST-MENOPAUSAL: ICD-10-CM

## 2025-01-07 RX ORDER — ESTRADIOL 0.1 MG/G
CREAM VAGINAL
Qty: 42.5 G | Refills: 3 | Status: SHIPPED | OUTPATIENT
Start: 2025-01-07

## 2025-03-01 DIAGNOSIS — E03.9 ACQUIRED HYPOTHYROIDISM: ICD-10-CM

## 2025-03-03 RX ORDER — LEVOTHYROXINE SODIUM 100 UG/1
100 TABLET ORAL
Qty: 90 TABLET | Refills: 3 | Status: SHIPPED | OUTPATIENT
Start: 2025-03-03

## 2025-03-19 DIAGNOSIS — R19.7 DIARRHEA, UNSPECIFIED TYPE: ICD-10-CM

## 2025-03-24 RX ORDER — CHOLESTYRAMINE 4 G/9G
POWDER, FOR SUSPENSION ORAL
Qty: 180 PACKET | Refills: 1 | Status: SHIPPED | OUTPATIENT
Start: 2025-03-24

## 2025-06-16 DIAGNOSIS — Z78.0 POST-MENOPAUSAL: ICD-10-CM

## 2025-06-16 RX ORDER — ESTRADIOL 0.1 MG/G
CREAM VAGINAL
Qty: 42.5 G | Refills: 3 | Status: SHIPPED | OUTPATIENT
Start: 2025-06-16

## 2025-07-30 RX ORDER — ASPIRIN 325 MG
325 TABLET, DELAYED RELEASE (ENTERIC COATED) ORAL EVERY MORNING
COMMUNITY

## 2025-07-30 RX ORDER — FLECAINIDE ACETATE 50 MG/1
100 TABLET ORAL
COMMUNITY
Start: 2025-04-22

## 2025-07-30 RX ORDER — HYOSCYAMINE SULFATE 0.12 MG/1
TABLET SUBLINGUAL
COMMUNITY

## 2025-07-30 RX ORDER — LEVOTHYROXINE SODIUM 112 UG/1
TABLET ORAL
COMMUNITY

## 2025-07-30 RX ORDER — MUPIROCIN 20 MG/G
OINTMENT TOPICAL
COMMUNITY
Start: 2025-03-18

## 2025-07-30 RX ORDER — ASPIRIN 325 MG
TABLET ORAL
COMMUNITY

## 2025-07-30 RX ORDER — CARVEDILOL 12.5 MG/1
12.5 TABLET ORAL
COMMUNITY
Start: 2024-10-29

## 2025-07-30 RX ORDER — AMLODIPINE BESYLATE 2.5 MG/1
TABLET ORAL
COMMUNITY

## 2025-07-30 RX ORDER — LEVOTHYROXINE SODIUM 100 UG/1
100 TABLET ORAL
COMMUNITY
Start: 2024-10-28

## 2025-07-30 RX ORDER — AMITRIPTYLINE HYDROCHLORIDE 25 MG/1
25 TABLET, FILM COATED ORAL NIGHTLY
COMMUNITY
Start: 2024-10-28

## 2025-07-30 RX ORDER — FLECAINIDE ACETATE 100 MG/1
100 TABLET ORAL
COMMUNITY
Start: 2025-04-22

## 2025-07-30 RX ORDER — IRBESARTAN 150 MG/1
150 TABLET ORAL EVERY MORNING
COMMUNITY
Start: 2024-11-25

## 2025-07-30 RX ORDER — CARVEDILOL 6.25 MG/1
TABLET ORAL
COMMUNITY

## 2025-07-31 ENCOUNTER — CLINICAL SUPPORT (OUTPATIENT)
Dept: OBSTETRICS AND GYNECOLOGY | Facility: CLINIC | Age: 75
End: 2025-07-31
Payer: MEDICARE

## 2025-07-31 ENCOUNTER — OFFICE VISIT (OUTPATIENT)
Dept: PRIMARY CARE CLINIC | Facility: CLINIC | Age: 75
End: 2025-07-31
Payer: MEDICARE

## 2025-07-31 VITALS
BODY MASS INDEX: 28.11 KG/M2 | DIASTOLIC BLOOD PRESSURE: 77 MMHG | WEIGHT: 158.63 LBS | HEART RATE: 74 BPM | OXYGEN SATURATION: 98 % | HEIGHT: 63 IN | SYSTOLIC BLOOD PRESSURE: 135 MMHG | RESPIRATION RATE: 18 BRPM

## 2025-07-31 DIAGNOSIS — E03.9 ACQUIRED HYPOTHYROIDISM: ICD-10-CM

## 2025-07-31 DIAGNOSIS — E78.5 HYPERLIPIDEMIA, UNSPECIFIED HYPERLIPIDEMIA TYPE: ICD-10-CM

## 2025-07-31 DIAGNOSIS — I10 HYPERTENSION, UNSPECIFIED TYPE: Primary | ICD-10-CM

## 2025-07-31 DIAGNOSIS — E78.5 HYPERLIPIDEMIA, UNSPECIFIED HYPERLIPIDEMIA TYPE: Primary | ICD-10-CM

## 2025-07-31 LAB
ANION GAP SERPL CALC-SCNC: 11 MMOL/L (ref 8–17)
BUN/CREAT SERPL: 13.8 (ref 6–20)
CALCIUM SERPL-MCNC: 8.8 MG/DL (ref 8.6–10.2)
CARBON DIOXIDE, CO2: 25 MMOL/L (ref 22–29)
CHLORIDE: 101 MMOL/L (ref 98–107)
CHOLEST SERPL-MCNC: 93 MG/DL (ref 0–150)
CHOLEST SERPL-MSCNC: 204 MG/DL (ref 100–200)
CREAT SERPL-MCNC: 0.71 MG/DL (ref 0.5–0.9)
GFR ESTIMATION: 88.06 ML/MIN/1.73M2
GLUCOSE: 90 MG/DL (ref 82–115)
HDLC SERPL-MCNC: 77 MG/DL
LDL/HDL RATIO: 1.4 (ref 1–3)
LDLC SERPL CALC-MCNC: 108.4 MG/DL (ref 0–100)
POTASSIUM: 4.7 MMOL/L (ref 3.5–5.1)
SODIUM: 137 MMOL/L (ref 136–145)
T4, FREE: 1.14 NG/DL (ref 0.93–1.7)
TSH SERPL DL<=0.005 MIU/L-ACNC: 3.38 UIU/ML (ref 0.27–4.2)
UREA NITROGEN (BUN): 9.8 MG/DL (ref 8–23)

## 2025-07-31 NOTE — PROGRESS NOTES
"  Brigitte Bell presented for a  Medicare AWV and comprehensive Health Risk Assessment today. The following components were reviewed and updated:      Health Risk Assessment      Health Maintenance Topics with due status: Not Due       Topic Last Completion Date    TETANUS VACCINE 05/10/2019    DEXA Scan 05/04/2022    Lipid Panel 07/16/2024    Influenza Vaccine 09/13/2024      Patient Care Team:  Lotus Berger MD as PCP - General (Internal Medicine)  Kenyon Edwards MD as Consulting Physician (Urology)    Dr Jin was prior Cardiologist, she is going to establish with a new one, she had a recent Ablation done and has had no more afib episodes   Established with Dr Nugent for diarrhea, she reports improvement in symptoms   Corey Kulkarni NP is Dermatology       See Completed Assessments for Annual Wellness Visit within the encounter summary.             The following assessments were completed:    Depression Screening  Depression Patient Health Questionnaire (PHQ-2)  Over the last two weeks how often have you been bothered by little interest or pleasure in doing things: Not at all  Over the last two weeks how often have you been bothered by feeling down, depressed or hopeless: Not at all  PHQ-2 Total Score: 0               Cognitive Function Screening      Nutrition Screening  ADL Screening  PAQ Screening  Mini Mental         Vitals:    07/31/25 0824   BP: 135/77   BP Location: Right arm   Patient Position: Sitting   Pulse: 74   Resp: 18   SpO2: 98%   Weight: 71.9 kg (158 lb 9.6 oz)   Height: 5' 3" (1.6 m)     Body mass index is 28.09 kg/m².        Physical Exam  Vitals reviewed.   Constitutional:       Appearance: Normal appearance.   HENT:      Head: Normocephalic and atraumatic.   Eyes:      Extraocular Movements: Extraocular movements intact.      Conjunctiva/sclera: Conjunctivae normal.      Pupils: Pupils are equal, round, and reactive to light.   Cardiovascular:      Rate and Rhythm: Normal rate and regular " rhythm.   Pulmonary:      Effort: Pulmonary effort is normal.      Breath sounds: Normal breath sounds.   Abdominal:      General: Bowel sounds are normal.   Musculoskeletal:      Right lower leg: No edema.      Left lower leg: No edema.   Skin:     General: Skin is warm.      Capillary Refill: Capillary refill takes less than 2 seconds.   Neurological:      Mental Status: She is alert and oriented to person, place, and time.   Psychiatric:         Mood and Affect: Mood normal.          Diagnoses and health risks identified today and associated recommendations/orders:    1. Acquired hypothyroidism    - TSH; Future  - T4, Free; Future  - TSH  - T4, Free    2. Hyperlipidemia, unspecified hyperlipidemia type    - Lipid Panel; Future  - Lipid Panel    3. Hypertension, unspecified type    - Basic Metabolic Panel; Future  - Basic Metabolic Panel      Provided Brigitte with a 5-10 year written screening schedule and personal prevention plan. Recommendations were developed using the USPSTF age appropriate recommendations. Education, counseling, and referrals were provided as needed. After Visit Summary printed and given to patient which includes a list of additional screenings\tests needed.    I offered to discuss end of life issues, including information on how to make advance directives that the patient could use to name someone who would make medical decisions on their behalf if they became too ill to make themselves.    _x__Patient declined - already done.  ___Virtual Visit, not discussed  ___Patient is interested, I provided paperwork and offered to discuss        Follow up in about 1 year (around 7/31/2026).    Lotus Berger MD

## 2025-08-25 DIAGNOSIS — Z78.0 POST-MENOPAUSAL: ICD-10-CM

## 2025-08-26 RX ORDER — ESTRADIOL 0.1 MG/G
CREAM VAGINAL
Qty: 42.5 G | Refills: 3 | Status: SHIPPED | OUTPATIENT
Start: 2025-08-26